# Patient Record
Sex: FEMALE | Race: OTHER | HISPANIC OR LATINO | Employment: UNEMPLOYED | URBAN - METROPOLITAN AREA
[De-identification: names, ages, dates, MRNs, and addresses within clinical notes are randomized per-mention and may not be internally consistent; named-entity substitution may affect disease eponyms.]

---

## 2024-02-28 ENCOUNTER — TELEPHONE (OUTPATIENT)
Dept: OBSTETRICS AND GYNECOLOGY | Facility: CLINIC | Age: 24
End: 2024-02-28

## 2024-02-28 NOTE — TELEPHONE ENCOUNTER
----- Message from Nidia Madden sent at 2/28/2024 11:16 AM CST -----  Contact: Yen Small  MRN: [id number]  Home Phone      [phone]  Work Phone      Not on file.  Mobile          Not on file.    No care team member to display  OB? Yes, Unknown  What phone number can you be reached at? [phone]  Message: positive pregnancy through [facility]

## 2024-03-04 ENCOUNTER — OFFICE VISIT (OUTPATIENT)
Dept: OBSTETRICS AND GYNECOLOGY | Facility: CLINIC | Age: 24
End: 2024-03-04
Payer: MEDICAID

## 2024-03-04 ENCOUNTER — LAB VISIT (OUTPATIENT)
Dept: LAB | Facility: HOSPITAL | Age: 24
End: 2024-03-04
Attending: OBSTETRICS & GYNECOLOGY
Payer: MEDICAID

## 2024-03-04 VITALS
BODY MASS INDEX: 24.88 KG/M2 | HEIGHT: 62 IN | DIASTOLIC BLOOD PRESSURE: 62 MMHG | WEIGHT: 135.19 LBS | SYSTOLIC BLOOD PRESSURE: 100 MMHG | HEART RATE: 102 BPM

## 2024-03-04 DIAGNOSIS — R39.15 URINARY URGENCY: ICD-10-CM

## 2024-03-04 DIAGNOSIS — Z32.01 PREGNANCY EXAMINATION OR TEST, POSITIVE RESULT: Primary | ICD-10-CM

## 2024-03-04 DIAGNOSIS — Z11.3 SCREENING EXAMINATION FOR VENEREAL DISEASE: ICD-10-CM

## 2024-03-04 DIAGNOSIS — N63.11 MASS OF UPPER OUTER QUADRANT OF RIGHT BREAST: ICD-10-CM

## 2024-03-04 DIAGNOSIS — Z12.4 CERVICAL CANCER SCREENING: ICD-10-CM

## 2024-03-04 DIAGNOSIS — Z32.01 PREGNANCY EXAMINATION OR TEST, POSITIVE RESULT: ICD-10-CM

## 2024-03-04 LAB
ABO + RH BLD: NORMAL
B-HCG UR QL: POSITIVE
BASOPHILS # BLD AUTO: 0.04 K/UL (ref 0–0.2)
BASOPHILS NFR BLD: 0.6 % (ref 0–1.9)
BLD GP AB SCN CELLS X3 SERPL QL: NORMAL
CTP QC/QA: YES
DIFFERENTIAL METHOD BLD: ABNORMAL
EOSINOPHIL # BLD AUTO: 0 K/UL (ref 0–0.5)
EOSINOPHIL NFR BLD: 0.1 % (ref 0–8)
ERYTHROCYTE [DISTWIDTH] IN BLOOD BY AUTOMATED COUNT: 13.3 % (ref 11.5–14.5)
HBV SURFACE AG SERPL QL IA: NORMAL
HCG INTACT+B SERPL-ACNC: 1500 MIU/ML
HCT VFR BLD AUTO: 37.9 % (ref 37–48.5)
HCV AB SERPL QL IA: NORMAL
HGB BLD-MCNC: 12.7 G/DL (ref 12–16)
HIV 1+2 AB+HIV1 P24 AG SERPL QL IA: NORMAL
IMM GRANULOCYTES # BLD AUTO: 0.01 K/UL (ref 0–0.04)
IMM GRANULOCYTES NFR BLD AUTO: 0.1 % (ref 0–0.5)
LYMPHOCYTES # BLD AUTO: 1.8 K/UL (ref 1–4.8)
LYMPHOCYTES NFR BLD: 26.5 % (ref 18–48)
MCH RBC QN AUTO: 29.7 PG (ref 27–31)
MCHC RBC AUTO-ENTMCNC: 33.5 G/DL (ref 32–36)
MCV RBC AUTO: 89 FL (ref 82–98)
MONOCYTES # BLD AUTO: 0.5 K/UL (ref 0.3–1)
MONOCYTES NFR BLD: 7.6 % (ref 4–15)
NEUTROPHILS # BLD AUTO: 4.4 K/UL (ref 1.8–7.7)
NEUTROPHILS NFR BLD: 65.1 % (ref 38–73)
NRBC BLD-RTO: 0 /100 WBC
PLATELET # BLD AUTO: 265 K/UL (ref 150–450)
PMV BLD AUTO: 9.1 FL (ref 9.2–12.9)
RBC # BLD AUTO: 4.27 M/UL (ref 4–5.4)
WBC # BLD AUTO: 6.8 K/UL (ref 3.9–12.7)

## 2024-03-04 PROCEDURE — 99999 PR PBB SHADOW E&M-EST. PATIENT-LVL III: CPT | Mod: PBBFAC,,, | Performed by: OBSTETRICS & GYNECOLOGY

## 2024-03-04 PROCEDURE — 99204 OFFICE O/P NEW MOD 45 MIN: CPT | Mod: S$PBB,TH,, | Performed by: OBSTETRICS & GYNECOLOGY

## 2024-03-04 PROCEDURE — 84702 CHORIONIC GONADOTROPIN TEST: CPT | Performed by: OBSTETRICS & GYNECOLOGY

## 2024-03-04 PROCEDURE — 86592 SYPHILIS TEST NON-TREP QUAL: CPT | Performed by: OBSTETRICS & GYNECOLOGY

## 2024-03-04 PROCEDURE — 87340 HEPATITIS B SURFACE AG IA: CPT | Performed by: OBSTETRICS & GYNECOLOGY

## 2024-03-04 PROCEDURE — 86901 BLOOD TYPING SEROLOGIC RH(D): CPT | Performed by: OBSTETRICS & GYNECOLOGY

## 2024-03-04 PROCEDURE — 86803 HEPATITIS C AB TEST: CPT | Performed by: OBSTETRICS & GYNECOLOGY

## 2024-03-04 PROCEDURE — 81220 CFTR GENE COM VARIANTS: CPT | Performed by: OBSTETRICS & GYNECOLOGY

## 2024-03-04 PROCEDURE — 87624 HPV HI-RISK TYP POOLED RSLT: CPT | Performed by: OBSTETRICS & GYNECOLOGY

## 2024-03-04 PROCEDURE — 86762 RUBELLA ANTIBODY: CPT | Performed by: OBSTETRICS & GYNECOLOGY

## 2024-03-04 PROCEDURE — 85025 COMPLETE CBC W/AUTO DIFF WBC: CPT | Performed by: OBSTETRICS & GYNECOLOGY

## 2024-03-04 PROCEDURE — 81025 URINE PREGNANCY TEST: CPT | Mod: PBBFAC | Performed by: OBSTETRICS & GYNECOLOGY

## 2024-03-04 PROCEDURE — 36415 COLL VENOUS BLD VENIPUNCTURE: CPT | Performed by: OBSTETRICS & GYNECOLOGY

## 2024-03-04 PROCEDURE — 87491 CHLMYD TRACH DNA AMP PROBE: CPT | Performed by: OBSTETRICS & GYNECOLOGY

## 2024-03-04 PROCEDURE — 88141 CYTOPATH C/V INTERPRET: CPT | Mod: ,,, | Performed by: PATHOLOGY

## 2024-03-04 PROCEDURE — 87086 URINE CULTURE/COLONY COUNT: CPT | Performed by: OBSTETRICS & GYNECOLOGY

## 2024-03-04 PROCEDURE — 87389 HIV-1 AG W/HIV-1&-2 AB AG IA: CPT | Performed by: OBSTETRICS & GYNECOLOGY

## 2024-03-04 PROCEDURE — 99999PBSHW POCT URINE PREGNANCY: Mod: PBBFAC,,,

## 2024-03-04 PROCEDURE — 99213 OFFICE O/P EST LOW 20 MIN: CPT | Mod: PBBFAC | Performed by: OBSTETRICS & GYNECOLOGY

## 2024-03-04 PROCEDURE — 88175 CYTOPATH C/V AUTO FLUID REDO: CPT | Performed by: PATHOLOGY

## 2024-03-04 NOTE — PROGRESS NOTES
Subjective:   Patient ID: Manuel Davies is a 23 y.o. y.o. female.     Chief Complaint: Missed Menses       History of Present Illness:    Manuel Washington presents today complaining of amenorrhea. Patient's last menstrual period was 2023.. Prior menstrual cycles have been regular. She reports breast tenderness, positive home pregnancy test, and no other symptoms. UPT is positive.       History reviewed. No pertinent past medical history.  History reviewed. No pertinent surgical history.  Social History     Socioeconomic History    Marital status: Significant Other   Tobacco Use    Smoking status: Never    Smokeless tobacco: Never   Substance and Sexual Activity    Alcohol use: Not Currently     Comment: socially    Drug use: Never    Sexual activity: Yes     Partners: Male     Birth control/protection: None     Comment:      Family History   Problem Relation Age of Onset    Breast cancer Neg Hx     Colon cancer Neg Hx     Ovarian cancer Neg Hx      OB History    Para Term  AB Living   0 0 0 0 0 0   SAB IAB Ectopic Multiple Live Births   0 0 0 0 0         ROS:   Review of Systems   Constitutional:  Negative for chills, diaphoresis, fatigue, fever and unexpected weight change.   HENT:  Negative for congestion, hearing loss, postnasal drip, rhinorrhea, sinus pressure, sinus pain, sore throat and tinnitus.    Eyes:  Negative for pain, discharge and visual disturbance.   Respiratory:  Negative for apnea, cough, shortness of breath and wheezing.    Cardiovascular:  Negative for chest pain, palpitations and leg swelling.   Gastrointestinal:  Negative for abdominal pain, constipation, diarrhea, nausea and vomiting.   Endocrine: Negative for cold intolerance, heat intolerance, polydipsia, polyphagia and polyuria.   Genitourinary:  Negative for difficulty urinating, dyspareunia, dysuria, enuresis, flank pain, frequency, genital sores, hematuria, menstrual problem, pelvic pain,  urgency, vaginal bleeding, vaginal discharge and vaginal pain.   Musculoskeletal:  Negative for arthralgias, back pain, joint swelling, myalgias, neck pain and neck stiffness.   Skin:  Negative for color change, pallor and rash.   Allergic/Immunologic: Negative for environmental allergies, food allergies and immunocompromised state.   Neurological:  Negative for dizziness, weakness, light-headedness, numbness and headaches.   Hematological:  Negative for adenopathy. Does not bruise/bleed easily.   Psychiatric/Behavioral:  Negative for agitation and confusion. The patient is not nervous/anxious.            Objective:   Vital Signs:  Vitals:    03/04/24 1051   BP: 100/62   Pulse: 102       Physical Exam:  General:  alert, cooperative, no distress   Head: Normocephalic, atraumatic   Neck: Supple, Normal ROM   Breast:  1cm cystic mass at 10 oclock.     Respiratory: Normal effort   Neuro/Psych: Alert and oriented, appropriate mood and affect   Abdomen:   Soft, NT   Pelvis: External genitalia: normal general appearance  Urinary system: urethral meatus normal, bladder nontender  Vaginal: normal mucosa without prolapse or lesions  Cervix: normal appearance  Uterus: normal size, shape, position  Adnexa: normal size, nontender bilaterally       Assessment:      1. Amenorrhea    2. Urinary urgency    3. Screening examination for venereal disease    4. Cervical cancer screening          Plan:        Amenorrhea  -     POCT urine pregnancy    Urinary urgency  -     Urine culture    Screening examination for venereal disease  -     C. trachomatis/N. gonorrhoeae by AMP DNA [facility]; Cervix    Cervical cancer screening  -     Liquid-Based Pap Smear, Screening        1. Pap today  2. Prenatal labs ordered and GC/CT performed.  3. Schedule Ultrasound  4. Follow up in 4 weeks after ultrasound.    Patient was counseled today on proper weight gain based on the [place] [facility]'s recommendations based on her pre-pregnancy weight.  Discussed foods to avoid in pregnancy (i.e. sushi, fish that are high in mercury, deli meat, and unpasteurized cheeses). Discussed prenatal vitamin options (i.e. stool softener, DHA). She was also counseled on safe, healthy behavior as well as medications safe in pregnancy.

## 2024-03-05 ENCOUNTER — TELEPHONE (OUTPATIENT)
Dept: OBSTETRICS AND GYNECOLOGY | Facility: CLINIC | Age: 24
End: 2024-03-05
Payer: MEDICAID

## 2024-03-05 LAB
BACTERIA UR CULT: NORMAL
BACTERIA UR CULT: NORMAL
C TRACH DNA SPEC QL NAA+PROBE: DETECTED
N GONORRHOEA DNA SPEC QL NAA+PROBE: NOT DETECTED
RPR SER QL: NORMAL
RUBV IGG SER-ACNC: <5 IU/ML
RUBV IGG SER-IMP: ABNORMAL

## 2024-03-05 NOTE — TELEPHONE ENCOUNTER
Sara ID# [id number]- date of birth and name verified.   Contacted pt to discuss lack of insurance for maternity care.  Pt states she applied 3/4/2024.  Instructed to bring a copy of eligibility letter or card once received, pt verbalized understanding.

## 2024-03-06 DIAGNOSIS — A74.9 CHLAMYDIA INFECTION AFFECTING PREGNANCY, ANTEPARTUM: Primary | ICD-10-CM

## 2024-03-06 DIAGNOSIS — O98.819 CHLAMYDIA INFECTION AFFECTING PREGNANCY, ANTEPARTUM: Primary | ICD-10-CM

## 2024-03-06 RX ORDER — AZITHROMYCIN 500 MG/1
1000 TABLET, FILM COATED ORAL ONCE
Qty: 2 TABLET | Refills: 0 | Status: SHIPPED | OUTPATIENT
Start: 2024-03-06 | End: 2024-03-06

## 2024-03-08 LAB — CFTR MUT ANL BLD/T: NORMAL

## 2024-03-11 ENCOUNTER — TELEPHONE (OUTPATIENT)
Dept: OBSTETRICS AND GYNECOLOGY | Facility: CLINIC | Age: 24
End: 2024-03-11
Payer: MEDICAID

## 2024-03-11 LAB
FINAL PATHOLOGIC DIAGNOSIS: ABNORMAL
Lab: ABNORMAL

## 2024-03-11 NOTE — TELEPHONE ENCOUNTER
----- Message from Nidia Madden sent at 3/11/2024 11:46 AM CDT -----  Contact: Self  Manuel Davies  MRN: [id number]  Home Phone      [phone]  Work Phone      Not on file.  Mobile          [phone]    Patient Care Team:  No, Primary Doctor as PCP - Linn West MD as Consulting Physician (Obstetrics and Gynecology)  OB? Yes, Unknown  What phone number can you be reached at? [phone]  Message: states she needs medication refilled for partner  Pharmacy: [facility] in [place]

## 2024-03-11 NOTE — TELEPHONE ENCOUNTER
Patient was called using language line  Annetta ID# [id number]. Pt called to initiate partner therapy. Partner with patient and received information from partner (MRN [id number]).

## 2024-03-15 LAB
HPV HR 12 DNA SPEC QL NAA+PROBE: POSITIVE
HPV16 AG SPEC QL: NEGATIVE
HPV18 DNA SPEC QL NAA+PROBE: NEGATIVE

## 2024-03-23 ENCOUNTER — HOSPITAL ENCOUNTER (EMERGENCY)
Facility: HOSPITAL | Age: 24
Discharge: HOME | End: 2024-03-23
Attending: SURGERY

## 2024-03-23 VITALS
RESPIRATION RATE: 18 BRPM | HEART RATE: 71 BPM | HEIGHT: 68 IN | DIASTOLIC BLOOD PRESSURE: 64 MMHG | OXYGEN SATURATION: 100 % | BODY MASS INDEX: 20.92 KG/M2 | WEIGHT: 138 LBS | TEMPERATURE: 99 F | SYSTOLIC BLOOD PRESSURE: 100 MMHG

## 2024-03-23 DIAGNOSIS — O03.9 MISCARRIAGE: Primary | ICD-10-CM

## 2024-03-23 LAB
ALBUMIN SERPL BCP-MCNC: 4 G/DL (ref 3.5–5.2)
ALP SERPL-CCNC: 65 U/L (ref 55–135)
ALT SERPL W/O P-5'-P-CCNC: 16 U/L (ref 10–44)
AMORPH CRY URNS QL MICRO: ABNORMAL
ANION GAP SERPL CALC-SCNC: 9 MMOL/L (ref 8–16)
AST SERPL-CCNC: 20 U/L (ref 10–40)
BACTERIA #/AREA URNS HPF: ABNORMAL /HPF
BASOPHILS # BLD AUTO: 0.05 K/UL (ref 0–0.2)
BASOPHILS NFR BLD: 0.5 % (ref 0–1.9)
BILIRUB SERPL-MCNC: 0.3 MG/DL (ref 0.1–1)
BILIRUB UR QL STRIP: NEGATIVE
BUN SERPL-MCNC: 11 MG/DL (ref 6–20)
CALCIUM SERPL-MCNC: 9.4 MG/DL (ref 8.7–10.5)
CHLORIDE SERPL-SCNC: 107 MMOL/L (ref 95–110)
CLARITY UR: CLEAR
CO2 SERPL-SCNC: 21 MMOL/L (ref 23–29)
COLOR UR: YELLOW
CREAT SERPL-MCNC: 0.6 MG/DL (ref 0.5–1.4)
DIFFERENTIAL METHOD BLD: NORMAL
EOSINOPHIL # BLD AUTO: 0.1 K/UL (ref 0–0.5)
EOSINOPHIL NFR BLD: 0.7 % (ref 0–8)
ERYTHROCYTE [DISTWIDTH] IN BLOOD BY AUTOMATED COUNT: 13.1 % (ref 11.5–14.5)
EST. GFR  (NO RACE VARIABLE): >60 ML/MIN/1.73 M^2
GLUCOSE SERPL-MCNC: 89 MG/DL (ref 70–110)
GLUCOSE UR QL STRIP: NEGATIVE
HCG INTACT+B SERPL-ACNC: NORMAL MIU/ML
HCT VFR BLD AUTO: 37.2 % (ref 37–48.5)
HGB BLD-MCNC: 12.7 G/DL (ref 12–16)
HGB UR QL STRIP: ABNORMAL
IMM GRANULOCYTES # BLD AUTO: 0.03 K/UL (ref 0–0.04)
IMM GRANULOCYTES NFR BLD AUTO: 0.3 % (ref 0–0.5)
KETONES UR QL STRIP: NEGATIVE
LEUKOCYTE ESTERASE UR QL STRIP: NEGATIVE
LYMPHOCYTES # BLD AUTO: 2.2 K/UL (ref 1–4.8)
LYMPHOCYTES NFR BLD: 22.4 % (ref 18–48)
MCH RBC QN AUTO: 30.1 PG (ref 27–31)
MCHC RBC AUTO-ENTMCNC: 34.1 G/DL (ref 32–36)
MCV RBC AUTO: 88 FL (ref 82–98)
MICROSCOPIC COMMENT: ABNORMAL
MONOCYTES # BLD AUTO: 0.9 K/UL (ref 0.3–1)
MONOCYTES NFR BLD: 8.7 % (ref 4–15)
NEUTROPHILS # BLD AUTO: 6.5 K/UL (ref 1.8–7.7)
NEUTROPHILS NFR BLD: 67.4 % (ref 38–73)
NITRITE UR QL STRIP: NEGATIVE
NRBC BLD-RTO: 0 /100 WBC
PH UR STRIP: 6 [PH] (ref 5–8)
PLATELET # BLD AUTO: 283 K/UL (ref 150–450)
PMV BLD AUTO: 9.4 FL (ref 9.2–12.9)
POTASSIUM SERPL-SCNC: 3.9 MMOL/L (ref 3.5–5.1)
PROT SERPL-MCNC: 7.2 G/DL (ref 6–8.4)
PROT UR QL STRIP: ABNORMAL
RBC # BLD AUTO: 4.22 M/UL (ref 4–5.4)
RBC #/AREA URNS HPF: 15 /HPF (ref 0–4)
SODIUM SERPL-SCNC: 137 MMOL/L (ref 136–145)
SP GR UR STRIP: 1.02 (ref 1–1.03)
SQUAMOUS #/AREA URNS HPF: 2 /HPF
URN SPEC COLLECT METH UR: ABNORMAL
UROBILINOGEN UR STRIP-ACNC: NEGATIVE EU/DL
WBC # BLD AUTO: 9.72 K/UL (ref 3.9–12.7)

## 2024-03-23 PROCEDURE — 88305 TISSUE EXAM BY PATHOLOGIST: CPT | Performed by: PATHOLOGY

## 2024-03-23 PROCEDURE — 81000 URINALYSIS NONAUTO W/SCOPE: CPT | Performed by: SURGERY

## 2024-03-23 PROCEDURE — 88305 TISSUE EXAM BY PATHOLOGIST: CPT | Mod: 26,,, | Performed by: PATHOLOGY

## 2024-03-23 PROCEDURE — 84702 CHORIONIC GONADOTROPIN TEST: CPT | Performed by: SURGERY

## 2024-03-23 PROCEDURE — 80053 COMPREHEN METABOLIC PANEL: CPT | Performed by: SURGERY

## 2024-03-23 PROCEDURE — 85025 COMPLETE CBC W/AUTO DIFF WBC: CPT | Performed by: SURGERY

## 2024-03-23 PROCEDURE — 99284 EMERGENCY DEPT VISIT MOD MDM: CPT | Mod: 25

## 2024-03-23 RX ORDER — HYDROCODONE BITARTRATE AND ACETAMINOPHEN 5; 325 MG/1; MG/1
1 TABLET ORAL EVERY 4 HOURS PRN
Qty: 15 TABLET | Refills: 0 | Status: SHIPPED | OUTPATIENT
Start: 2024-03-23 | End: 2024-03-26

## 2024-03-23 RX ORDER — MORPHINE SULFATE 2 MG/ML
4 INJECTION, SOLUTION INTRAMUSCULAR; INTRAVENOUS
Status: DISCONTINUED | OUTPATIENT
Start: 2024-03-23 | End: 2024-03-23

## 2024-03-23 RX ORDER — ONDANSETRON HYDROCHLORIDE 2 MG/ML
4 INJECTION, SOLUTION INTRAVENOUS
Status: DISCONTINUED | OUTPATIENT
Start: 2024-03-23 | End: 2024-03-23

## 2024-03-23 NOTE — ED NOTES
"Pt called from the bathroom stating that she passed a clot. Upon inspection a tissue approximately 3"x1" was collected. Dr. Felton notified.   "

## 2024-03-23 NOTE — ED PROVIDER NOTES
Encounter Date: 3/23/2024       History     Chief Complaint   Patient presents with    Vaginal Bleeding     Pt to ED with c/o vaginal bleed and lower abdominal cramping onset this morning. Pt is approximately 14 weeks pregnant and sees Dr. Chaidez. Pt speaks Moh[place] and needs an .     History of Present Illness  Manuel Davies is a 23 y.o. female that presents with bleeding  Patient with vaginal bleeding today, passage of tissue in the ED, first-trimester  Patient thinks that she had a miscarriage, no active bleeding at this time in ED  Rh (+) blood type, does not need RhoGAM, denies abdominal pain in triage  Starting to have mild cramping, nonacute abdomen on ER assessment today    The history is provided by the patient. The history is limited by a language barrier.     Review of patient's allergies indicates:  No Known Allergies    History reviewed. No pertinent past medical history.  History reviewed. No pertinent surgical history.  Family History   Problem Relation Age of Onset    Breast cancer Neg Hx     Colon cancer Neg Hx     Ovarian cancer Neg Hx      Social History     Tobacco Use    Smoking status: Never    Smokeless tobacco: Never   Substance Use Topics    Alcohol use: Not Currently     Comment: socially    Drug use: Never       Review of Systems   Constitutional: Negative.    HENT: Negative.     Eyes: Negative.    Respiratory: Negative.     Cardiovascular: Negative.    Gastrointestinal:  Positive for abdominal pain.   Genitourinary:  Positive for vaginal bleeding.   Musculoskeletal: Negative.    Skin: Negative.    Neurological: Negative.    Psychiatric/Behavioral: Negative.         Physical Exam     Initial Vitals [03/23/24 0424]   BP Pulse Resp Temp SpO2   124/78 70 18 98.6 °F (37 °C) 100 %      MAP       --         Physical Exam    Nursing note and vitals reviewed.  Constitutional: Vital signs are normal. She appears well-developed and well-nourished. She is cooperative.    HENT:   Head: Normocephalic and atraumatic.   Eyes: Conjunctivae, EOM and lids are normal. Pupils are equal, round, and reactive to light.   Neck: Trachea normal and phonation normal. Neck supple. No JVD present.   Normal range of motion.   Full passive range of motion without pain.     Cardiovascular:  Normal rate, regular rhythm, S1 normal, S2 normal, normal heart sounds, intact distal pulses and normal pulses.           Pulmonary/Chest: Effort normal and breath sounds normal.   Abdominal: Abdomen is soft and flat. Bowel sounds are normal.   Musculoskeletal:         General: Normal range of motion.      Cervical back: Full passive range of motion without pain, normal range of motion and neck supple.     Neurological: She is alert and oriented to person, place, and time. She has normal strength.   Skin: Skin is warm, dry and intact. Capillary refill takes less than 2 seconds.         ED Course   Procedures  Labs Reviewed   COMPREHENSIVE METABOLIC PANEL - Abnormal; Notable for the following components:       Result Value    CO2 21 (*)     All other components within normal limits   URINALYSIS, REFLEX TO URINE CULTURE - Abnormal; Notable for the following components:    Protein, UA Trace (*)     Occult Blood UA 3+ (*)     All other components within normal limits    Narrative:     Specimen Source->Urine   URINALYSIS MICROSCOPIC - Abnormal; Notable for the following components:    RBC, UA 15 (*)     All other components within normal limits    Narrative:     Specimen Source->Urine   CBC W/ AUTO DIFFERENTIAL   HCG, QUANTITATIVE   SPECIMEN TO PATHOLOGY, SURGERY          Imaging Results              US OB <14 Wks, TransAbd, Single Gestation (Final result)  Result time 24 08:17:25      Final result by Mak Martinez Jr., MD (24 08:17:25)                   Impression:      Negative pelvic ultrasound.  No intrauterine gestational sac is seen.  This may represent a spontaneous  or ectopic  pregnancy.  Recommend follow-up by ultrasound and serial pregnancy testing.      Electronically signed by: Mak Martinez MD  Date:    03/23/2024  Time:    08:17               Narrative:    EXAMINATION:  US OB <14 WEEKS TRANSABDOM, SINGLE GESTATION    CLINICAL HISTORY:  bleeding;    TECHNIQUE:  Sagittal and transverse images are obtained through the filled bladder.    COMPARISON:  None    FINDINGS:  The uterus measures 9.2 x 5.5 cm.  No intrauterine gestational sac is identified.  The endometrial lining is within normal limits measuring 1.2 cm, both layers.  No fluid is seen in the lumen.    The right ovary measures 3 x 2.3 cm without mass or cyst.  The left ovary measures 2.7 x 2.3 cm without mass or cyst.  No free fluid is seen and no ectopic gestational sac is demonstrated.                                       Medications - No data to display  Medical Decision Making  23-year-old female first-trimester pregnancy with bleeding & passage of tissue  Differential: subchorionic hemorrhage, miscarriage, bleeding with pregnancy    Problems Addressed:  Miscarriage: complicated acute illness or injury    Amount and/or Complexity of Data Reviewed  Labs: ordered. Decision-making details documented in ED Course.  Radiology: ordered and independent interpretation performed.    ED Management & Risks of Complication, Morbidity, & Mortality:  Lab work within normal limits, does not require RhoGAM in the ER today  Beta-hCG 11,000, ultrasound shows no gestational sac or fetus now  This is a likely spontaneous miscarriage based on previous ultrasound  Counseled at length; will follow-up with OBGYN 1st thing Monday  Norco for pain on discharge, bleeding precautions given on discharge    Need for Hospitalization or Surgery with Social Determinants of Health:  This patient does not need to be hospitalized on ER evaluation today  The patient's diagnosis is not limited by social determinants of health  Does not require surgery or  procedure (major or minor), no risk factors    Clinical Impression:  Final diagnoses:  [O03.9] Miscarriage (Primary)          ED Disposition Condition    Discharge Stable          ED Prescriptions       Medication Sig Dispense Start Date End Date Auth. Provider    HYDROcodone-acetaminophen (NORCO) 5-325 mg per tablet Take 1 tablet by mouth every 4 (four) hours as needed for Pain. 15 tablet 3/23/2024 3/26/2024 Cristino Felton MD          Follow-up Information       Follow up With Specialties Details Why Contact Info    Linn Chaidez MD Obstetrics and Gynecology Go in 2 days  [address] [place] [address]  [phone]               Cristino Felton MD  03/23/24 9362

## 2024-03-25 ENCOUNTER — LAB VISIT (OUTPATIENT)
Dept: LAB | Facility: HOSPITAL | Age: 24
End: 2024-03-25
Attending: OBSTETRICS & GYNECOLOGY

## 2024-03-25 ENCOUNTER — OFFICE VISIT (OUTPATIENT)
Dept: OBSTETRICS AND GYNECOLOGY | Facility: CLINIC | Age: 24
End: 2024-03-25

## 2024-03-25 ENCOUNTER — TELEPHONE (OUTPATIENT)
Dept: OBSTETRICS AND GYNECOLOGY | Facility: CLINIC | Age: 24
End: 2024-03-25

## 2024-03-25 VITALS
WEIGHT: 134.63 LBS | DIASTOLIC BLOOD PRESSURE: 66 MMHG | HEIGHT: 68 IN | BODY MASS INDEX: 20.4 KG/M2 | HEART RATE: 71 BPM | SYSTOLIC BLOOD PRESSURE: 112 MMHG

## 2024-03-25 DIAGNOSIS — O03.9 MISCARRIAGE: ICD-10-CM

## 2024-03-25 DIAGNOSIS — O20.9 VAGINAL BLEEDING AFFECTING EARLY PREGNANCY: ICD-10-CM

## 2024-03-25 DIAGNOSIS — O20.9 VAGINAL BLEEDING AFFECTING EARLY PREGNANCY: Primary | ICD-10-CM

## 2024-03-25 LAB — HCG INTACT+B SERPL-ACNC: 1334 MIU/ML

## 2024-03-25 PROCEDURE — 99213 OFFICE O/P EST LOW 20 MIN: CPT | Mod: PBBFAC | Performed by: OBSTETRICS & GYNECOLOGY

## 2024-03-25 PROCEDURE — 36415 COLL VENOUS BLD VENIPUNCTURE: CPT | Performed by: OBSTETRICS & GYNECOLOGY

## 2024-03-25 PROCEDURE — 99213 OFFICE O/P EST LOW 20 MIN: CPT | Mod: S$PBB,,, | Performed by: OBSTETRICS & GYNECOLOGY

## 2024-03-25 PROCEDURE — 84702 CHORIONIC GONADOTROPIN TEST: CPT | Performed by: OBSTETRICS & GYNECOLOGY

## 2024-03-25 PROCEDURE — 99999 PR PBB SHADOW E&M-EST. PATIENT-LVL III: CPT | Mod: PBBFAC,,, | Performed by: OBSTETRICS & GYNECOLOGY

## 2024-03-25 NOTE — TELEPHONE ENCOUNTER
Yen was called and appt was scheduled for pt to f/u from ER visit for SAB. She voiced understanding.

## 2024-03-25 NOTE — PROGRESS NOTES
Subjective:       Patient ID: Manuel Davies is a 23 y.o. female.    Chief Complaint:  Follow-up (E/r f/u sab)      History of Present Illness  Follow-up  Pertinent negatives include no abdominal pain, arthralgias, chest pain, chills, congestion, coughing, diaphoresis, fatigue, fever, headaches, joint swelling, myalgias, nausea, neck pain, numbness, rash, sore throat, vomiting or weakness.     Pt here today for ER follow up.  She started with heavy bleeding overnight and was evaluated in the ER.    Ultrasound at that time was negative (full report below)  Hcg 45777    FINDINGS:  The uterus measures 9.2 x 5.5 cm.  No intrauterine gestational sac is identified.  The endometrial lining is within normal limits measuring 1.2 cm, both layers.  No fluid is seen in the lumen.     The right ovary measures 3 x 2.3 cm without mass or cyst.  The left ovary measures 2.7 x 2.3 cm without mass or cyst.  No free fluid is seen and no ectopic gestational sac is demonstrated.     Impression:     Negative pelvic ultrasound.  No intrauterine gestational sac is seen.  This may represent a spontaneous  or ectopic pregnancy.  Recommend follow-up by ultrasound and serial pregnancy testing.      GYN & OB History  Patient's last menstrual period was 2023.   Date of Last Pap: 3/11/2024    OB History    Para Term  AB Living   1 0 0 0 0 0   SAB IAB Ectopic Multiple Live Births   0 0 0 0 0      # Outcome Date GA Lbr Js/2nd Weight Sex Delivery Anes PTL Lv   1 Current                Review of Systems  Review of Systems   Constitutional:  Negative for chills, diaphoresis, fatigue, fever and unexpected weight change.   HENT:  Negative for congestion, hearing loss, postnasal drip, rhinorrhea, sinus pressure, sinus pain, sore throat and tinnitus.    Eyes:  Negative for pain, discharge and visual disturbance.   Respiratory:  Negative for apnea, cough, shortness of breath and wheezing.    Cardiovascular:   Negative for chest pain, palpitations and leg swelling.   Gastrointestinal:  Negative for abdominal pain, constipation, diarrhea, nausea and vomiting.   Endocrine: Negative for cold intolerance, heat intolerance, polydipsia, polyphagia and polyuria.   Genitourinary:  Positive for vaginal bleeding. Negative for difficulty urinating, dyspareunia, dysuria, enuresis, flank pain, frequency, genital sores, hematuria, menstrual problem, pelvic pain, urgency, vaginal discharge and vaginal pain.   Musculoskeletal:  Negative for arthralgias, back pain, joint swelling, myalgias, neck pain and neck stiffness.   Skin:  Negative for color change, pallor and rash.   Allergic/Immunologic: Negative for environmental allergies, food allergies and immunocompromised state.   Neurological:  Negative for dizziness, weakness, light-headedness, numbness and headaches.   Hematological:  Negative for adenopathy. Does not bruise/bleed easily.   Psychiatric/Behavioral:  Negative for agitation and confusion. The patient is not nervous/anxious.            Objective:    Physical Exam:   Constitutional: She is oriented to person, place, and time. She appears well-developed and well-nourished. No distress.    HENT:   Head: Normocephalic and atraumatic.    Eyes: Conjunctivae and EOM are normal.      Pulmonary/Chest: Effort normal. No respiratory distress.          Genitourinary:    Uterus, right adnexa and left adnexa normal.   The external female genitalia was normal.   Cervix is normal. Right adnexum displays no tenderness and no fullness. Left adnexum displays no tenderness and no fullness. There is bleeding in the vagina. Cervix exhibits no motion tenderness, no lesion and no friability. Uterus is not enlarged and not tender.           Musculoskeletal: Normal range of motion.       Neurological: She is alert and oriented to person, place, and time.    Skin: Skin is warm and dry.    Psychiatric: She has a normal mood and affect. Her behavior is  normal. Judgment and thought content normal.          Assessment:        1. Vaginal bleeding affecting early pregnancy    2. Miscarriage               Plan:      Manuel Washington was seen today for follow-up.    Diagnoses and all orders for this visit:    Vaginal bleeding affecting early pregnancy  -     HCG, Quantitative; Future    Miscarriage  -     HCG, Quantitative; Future      History, examination and ultrasound findings most consistent with SAB.  Will confirm declining hcg today.

## 2024-03-25 NOTE — TELEPHONE ENCOUNTER
----- Message from Mavis Cruz MA sent at 3/25/2024  8:22 AM CDT -----  Contact: Yen / friend  Manuel Davies  MRN: [id number]  Home Phone      Not on file.  Work Phone      Not on file.  Mobile          [phone]    Patient Care Team:  No, Primary Doctor as PCP - Linn West MD as Consulting Physician (Obstetrics and Gynecology)  OB? No  What phone number can you be reached at? [phone]  Message: Needs to make appt for ER f/u miscarriage.

## 2024-04-03 LAB
FINAL PATHOLOGIC DIAGNOSIS: NORMAL
GROSS: NORMAL
Lab: NORMAL

## 2024-04-16 ENCOUNTER — TELEPHONE (OUTPATIENT)
Dept: OBSTETRICS AND GYNECOLOGY | Facility: CLINIC | Age: 24
End: 2024-04-16

## 2024-04-16 DIAGNOSIS — O03.9 SAB (SPONTANEOUS ABORTION): Primary | ICD-10-CM

## 2024-04-16 NOTE — TELEPHONE ENCOUNTER
Attempted to contact pt using language line  Fernando ID# [id number]. He attempted patient's main number twice. No answer and voicemail not set up. He attempted patients friend Yen's number in chart and her received message that number is not available.

## 2024-04-16 NOTE — TELEPHONE ENCOUNTER
Called pt using language line  Kathy ID# [id number]. Pt complained of starting with cramping today. Pt stated it feels like it was when she was having SAB last month and does not feel like menstrual-like cramping. Vaginal bleeding from SAB resolved on 4/1/24 per pt and she has not had a cycle since. Pt took hydrocodone rx today given by the ER at the time of her visit on 3/23/24, but c/o nausea with this. Please advise. Thank you.

## 2024-04-16 NOTE — TELEPHONE ENCOUNTER
----- Message from Mavis Cruz MA sent at 4/16/2024 11:41 AM CDT -----  Contact: self /   Manuel Davies  MRN: [id number]  Home Phone      [phone]  Work Phone      Not on file.  Mobile          [phone]    Patient Care Team:  No, Primary Doctor as PCP - General  Linn Chaidez MD as Consulting Physician (Obstetrics and Gynecology)  OB? No  What phone number can you be reached at? [phone]  Message:  Needs to speak to nurse regarding had miscarriage two weeks ago, and is still having cramping.

## 2024-04-17 ENCOUNTER — HOSPITAL ENCOUNTER (OUTPATIENT)
Dept: RADIOLOGY | Facility: HOSPITAL | Age: 24
Discharge: HOME | End: 2024-04-17
Attending: OBSTETRICS & GYNECOLOGY

## 2024-04-17 DIAGNOSIS — N63.11 MASS OF UPPER OUTER QUADRANT OF RIGHT BREAST: ICD-10-CM

## 2024-04-17 PROCEDURE — 76642 ULTRASOUND BREAST LIMITED: CPT | Mod: 26,RT,, | Performed by: RADIOLOGY

## 2024-04-17 PROCEDURE — 76642 ULTRASOUND BREAST LIMITED: CPT | Mod: TC,RT

## 2024-08-23 ENCOUNTER — TELEPHONE (OUTPATIENT)
Dept: OBSTETRICS AND GYNECOLOGY | Facility: CLINIC | Age: 24
End: 2024-08-23

## 2024-08-23 NOTE — TELEPHONE ENCOUNTER
----- Message from Mavis Cruz MA sent at 8/23/2024  9:30 AM CDT -----  Contact: Yne / friend  Manuel Davies  MRN: [id number]  Home Phone      [phone]  Work Phone      Not on file.  Mobile          [phone]    Patient Care Team:  No, Primary Doctor as PCP - Linn West MD as Consulting Physician (Obstetrics and Gynecology)  OB? No  What phone number can you be reached at? [phone]  Message: Needs to make appt for dx preg.  Patient will be a NP (self pay) and requesting Dr Chaidez.   lab results

## 2024-09-05 ENCOUNTER — TELEPHONE (OUTPATIENT)
Dept: OBSTETRICS AND GYNECOLOGY | Facility: CLINIC | Age: 24
End: 2024-09-05

## 2024-09-05 NOTE — TELEPHONE ENCOUNTER
----- Message from Mavis Cruz MA sent at 9/5/2024  9:02 AM CDT -----  Contact: self  Manuel Davies  MRN: [id number]  Home Phone      [phone]  Work Phone      Not on file.  Mobile          [phone]    Patient Care Team:  No, Primary Doctor as PCP - Linn West MD as Consulting Physician (Obstetrics and Gynecology)  OB? No  What phone number can you be reached at? [phone]  Message: Needs to make appt for dx preg, and requesting Dr Chaidez.

## 2024-09-16 ENCOUNTER — LAB VISIT (OUTPATIENT)
Dept: LAB | Facility: HOSPITAL | Age: 24
End: 2024-09-16
Attending: OBSTETRICS & GYNECOLOGY
Payer: MEDICAID

## 2024-09-16 ENCOUNTER — OFFICE VISIT (OUTPATIENT)
Dept: OBSTETRICS AND GYNECOLOGY | Facility: CLINIC | Age: 24
End: 2024-09-16
Payer: MEDICAID

## 2024-09-16 VITALS
BODY MASS INDEX: 21.22 KG/M2 | WEIGHT: 140 LBS | SYSTOLIC BLOOD PRESSURE: 104 MMHG | HEART RATE: 67 BPM | HEIGHT: 68 IN | DIASTOLIC BLOOD PRESSURE: 78 MMHG

## 2024-09-16 DIAGNOSIS — Z11.3 SCREENING EXAMINATION FOR VENEREAL DISEASE: ICD-10-CM

## 2024-09-16 DIAGNOSIS — Z32.01 PREGNANCY EXAMINATION OR TEST, POSITIVE RESULT: ICD-10-CM

## 2024-09-16 DIAGNOSIS — N91.2 AMENORRHEA: ICD-10-CM

## 2024-09-16 DIAGNOSIS — R39.15 URINARY URGENCY: ICD-10-CM

## 2024-09-16 DIAGNOSIS — Z32.01 PREGNANCY EXAMINATION OR TEST, POSITIVE RESULT: Primary | ICD-10-CM

## 2024-09-16 LAB
ABO + RH BLD: NORMAL
B-HCG UR QL: POSITIVE
BASOPHILS # BLD AUTO: 0.04 K/UL (ref 0–0.2)
BASOPHILS NFR BLD: 0.5 % (ref 0–1.9)
BLD GP AB SCN CELLS X3 SERPL QL: NORMAL
C TRACH DNA SPEC QL NAA+PROBE: NOT DETECTED
CTP QC/QA: YES
DIFFERENTIAL METHOD BLD: NORMAL
EOSINOPHIL # BLD AUTO: 0 K/UL (ref 0–0.5)
EOSINOPHIL NFR BLD: 0.5 % (ref 0–8)
ERYTHROCYTE [DISTWIDTH] IN BLOOD BY AUTOMATED COUNT: 13.3 % (ref 11.5–14.5)
HBV SURFACE AG SERPL QL IA: NORMAL
HCT VFR BLD AUTO: 37.9 % (ref 37–48.5)
HCV AB SERPL QL IA: NORMAL
HGB BLD-MCNC: 12.8 G/DL (ref 12–16)
HIV 1+2 AB+HIV1 P24 AG SERPL QL IA: NORMAL
IMM GRANULOCYTES # BLD AUTO: 0.03 K/UL (ref 0–0.04)
IMM GRANULOCYTES NFR BLD AUTO: 0.4 % (ref 0–0.5)
LYMPHOCYTES # BLD AUTO: 1.9 K/UL (ref 1–4.8)
LYMPHOCYTES NFR BLD: 25.3 % (ref 18–48)
MCH RBC QN AUTO: 30 PG (ref 27–31)
MCHC RBC AUTO-ENTMCNC: 33.8 G/DL (ref 32–36)
MCV RBC AUTO: 89 FL (ref 82–98)
MONOCYTES # BLD AUTO: 0.6 K/UL (ref 0.3–1)
MONOCYTES NFR BLD: 8.4 % (ref 4–15)
N GONORRHOEA DNA SPEC QL NAA+PROBE: NOT DETECTED
NEUTROPHILS # BLD AUTO: 4.8 K/UL (ref 1.8–7.7)
NEUTROPHILS NFR BLD: 64.9 % (ref 38–73)
NRBC BLD-RTO: 0 /100 WBC
PLATELET # BLD AUTO: 271 K/UL (ref 150–450)
PMV BLD AUTO: 9.3 FL (ref 9.2–12.9)
RBC # BLD AUTO: 4.26 M/UL (ref 4–5.4)
TREPONEMA PALLIDUM IGG+IGM AB [PRESENCE] IN SERUM OR PLASMA BY IMMUNOASSAY: NONREACTIVE
WBC # BLD AUTO: 7.34 K/UL (ref 3.9–12.7)

## 2024-09-16 PROCEDURE — 81025 URINE PREGNANCY TEST: CPT | Mod: PBBFAC | Performed by: OBSTETRICS & GYNECOLOGY

## 2024-09-16 PROCEDURE — 87591 N.GONORRHOEAE DNA AMP PROB: CPT | Performed by: OBSTETRICS & GYNECOLOGY

## 2024-09-16 PROCEDURE — 85025 COMPLETE CBC W/AUTO DIFF WBC: CPT | Performed by: OBSTETRICS & GYNECOLOGY

## 2024-09-16 PROCEDURE — 3008F BODY MASS INDEX DOCD: CPT | Mod: CPTII,,, | Performed by: OBSTETRICS & GYNECOLOGY

## 2024-09-16 PROCEDURE — 86593 SYPHILIS TEST NON-TREP QUANT: CPT | Performed by: OBSTETRICS & GYNECOLOGY

## 2024-09-16 PROCEDURE — 99999PBSHW POCT URINE PREGNANCY: Mod: PBBFAC,,,

## 2024-09-16 PROCEDURE — 3078F DIAST BP <80 MM HG: CPT | Mod: CPTII,,, | Performed by: OBSTETRICS & GYNECOLOGY

## 2024-09-16 PROCEDURE — 3074F SYST BP LT 130 MM HG: CPT | Mod: CPTII,,, | Performed by: OBSTETRICS & GYNECOLOGY

## 2024-09-16 PROCEDURE — 86900 BLOOD TYPING SEROLOGIC ABO: CPT | Performed by: OBSTETRICS & GYNECOLOGY

## 2024-09-16 PROCEDURE — 1159F MED LIST DOCD IN RCRD: CPT | Mod: CPTII,,, | Performed by: OBSTETRICS & GYNECOLOGY

## 2024-09-16 PROCEDURE — 87086 URINE CULTURE/COLONY COUNT: CPT | Performed by: OBSTETRICS & GYNECOLOGY

## 2024-09-16 PROCEDURE — 87389 HIV-1 AG W/HIV-1&-2 AB AG IA: CPT | Performed by: OBSTETRICS & GYNECOLOGY

## 2024-09-16 PROCEDURE — 86762 RUBELLA ANTIBODY: CPT | Performed by: OBSTETRICS & GYNECOLOGY

## 2024-09-16 PROCEDURE — 86803 HEPATITIS C AB TEST: CPT | Performed by: OBSTETRICS & GYNECOLOGY

## 2024-09-16 PROCEDURE — 86850 RBC ANTIBODY SCREEN: CPT | Performed by: OBSTETRICS & GYNECOLOGY

## 2024-09-16 PROCEDURE — 1160F RVW MEDS BY RX/DR IN RCRD: CPT | Mod: CPTII,,, | Performed by: OBSTETRICS & GYNECOLOGY

## 2024-09-16 PROCEDURE — 87491 CHLMYD TRACH DNA AMP PROBE: CPT | Performed by: OBSTETRICS & GYNECOLOGY

## 2024-09-16 PROCEDURE — 99204 OFFICE O/P NEW MOD 45 MIN: CPT | Mod: S$PBB,TH,, | Performed by: OBSTETRICS & GYNECOLOGY

## 2024-09-16 PROCEDURE — 99999 PR PBB SHADOW E&M-EST. PATIENT-LVL III: CPT | Mod: PBBFAC,,, | Performed by: OBSTETRICS & GYNECOLOGY

## 2024-09-16 PROCEDURE — 36415 COLL VENOUS BLD VENIPUNCTURE: CPT | Performed by: OBSTETRICS & GYNECOLOGY

## 2024-09-16 PROCEDURE — 87340 HEPATITIS B SURFACE AG IA: CPT | Performed by: OBSTETRICS & GYNECOLOGY

## 2024-09-16 PROCEDURE — 99213 OFFICE O/P EST LOW 20 MIN: CPT | Mod: PBBFAC,TH | Performed by: OBSTETRICS & GYNECOLOGY

## 2024-09-16 NOTE — PROGRESS NOTES
Subjective:   Patient ID: Manuel Davies is a 24 y.o. y.o. female.     Chief Complaint: Missed Menses       History of Present Illness:    Manuel Washington presents today complaining of amenorrhea. Patient's last menstrual period was 2024 (exact date).. Prior menstrual cycles have been regular. She reports breast tenderness, fatigue, frequent urination, and positive home pregnancy test. UPT is positive.       History reviewed. No pertinent past medical history.  History reviewed. No pertinent surgical history.  Social History     Socioeconomic History    Marital status: Significant Other   Tobacco Use    Smoking status: Never    Smokeless tobacco: Never   Substance and Sexual Activity    Alcohol use: Not Currently     Comment: Not currently    Drug use: Never    Sexual activity: Yes     Partners: Male     Birth control/protection: None     Family History   Problem Relation Name Age of Onset    Breast cancer Neg Hx      Colon cancer Neg Hx      Ovarian cancer Neg Hx       OB History    Para Term  AB Living   2 0 0 0 1 0   SAB IAB Ectopic Multiple Live Births   1 0 0 0 0      # Outcome Date GA Lbr Js/2nd Weight Sex Type Anes PTL Lv   2 Current            1 2024                 ROS:   Review of Systems   Constitutional:  Negative for chills, diaphoresis, fatigue, fever and unexpected weight change.   HENT:  Negative for congestion, hearing loss, postnasal drip, rhinorrhea, sinus pressure, sinus pain, sore throat and tinnitus.    Eyes:  Negative for pain, discharge and visual disturbance.   Respiratory:  Negative for apnea, cough, shortness of breath and wheezing.    Cardiovascular:  Negative for chest pain, palpitations and leg swelling.   Gastrointestinal:  Negative for abdominal pain, constipation, diarrhea, nausea and vomiting.   Endocrine: Negative for cold intolerance, heat intolerance, polydipsia, polyphagia and polyuria.   Genitourinary:  Positive for frequency.  Negative for difficulty urinating, dyspareunia, dysuria, enuresis, flank pain, genital sores, hematuria, menstrual problem, pelvic pain, urgency, vaginal bleeding, vaginal discharge and vaginal pain.   Musculoskeletal:  Negative for arthralgias, back pain, joint swelling, myalgias, neck pain and neck stiffness.   Skin:  Negative for color change, pallor and rash.   Allergic/Immunologic: Negative for environmental allergies, food allergies and immunocompromised state.   Neurological:  Negative for dizziness, weakness, light-headedness, numbness and headaches.   Hematological:  Negative for adenopathy. Does not bruise/bleed easily.   Psychiatric/Behavioral:  Negative for agitation and confusion. The patient is not nervous/anxious.            Objective:   Vital Signs:  Vitals:    09/16/24 1000   BP: 104/78   Pulse: 67       Physical Exam:  General:  alert,normal appearing gravid female   Head: Normocephalic, atraumatic   Neck: Supple, Normal ROM   Respiratory: Normal effort   Neuro/Psych: Alert and oriented, appropriate mood and affect   Abdomen:  soft, non-tender; bowel sounds normal   Pelvis: External genitalia: normal general appearance  Urinary system: urethral meatus normal, bladder nontender  Vaginal: normal mucosa without prolapse or lesions  Cervix: normal appearance  Uterus: normal size, shape, position  Adnexa: normal size, nontender bilaterally       Assessment:      1. Pregnancy examination or test, positive result    2. Amenorrhea    3. Urinary urgency    4. Screening examination for venereal disease          Plan:        Pregnancy examination or test, positive result  -     Type & Screen - Ob Profile; Future; Expected date: 09/16/2024  -     CBC Auto Differential; Future; Expected date: 09/16/2024  -     Hepatitis B Surface Antigen; Future; Expected date: 09/16/2024  -     Rubella Antibody, IgG; Future; Expected date: 09/16/2024  -     Treponema Pallidium Antibodies IgG, IgM; Future; Expected date:  09/16/2024  -     US OB/GYN Procedure ([facility]) - Extended List; Future; Expected date: 09/30/2024  -     Hepatitis C Antibody; Future; Expected date: 09/16/2024  -     HIV 1/2 Ag/Ab (4th Gen); Future; Expected date: 09/16/2024    Amenorrhea  -     POCT Urine Pregnancy    Urinary urgency  -     CULTURE, URINE    Screening examination for venereal disease  -     C. trachomatis/N. gonorrhoeae by AMP DNA [facility]; Cervix        1. Pap up to date  2. Prenatal labs ordered and GC/CT performed.  3. Schedule Ultrasound  4. Follow up in 4 weeks after ultrasound.    Patient was counseled today on proper weight gain based on the [facility]'s recommendations based on her pre-pregnancy weight. Discussed foods to avoid in pregnancy (i.e. sushi, fish that are high in mercury, deli meat, and unpasteurized cheeses). Discussed prenatal vitamin options (i.e. stool softener, DHA). She was also counseled on safe, healthy behavior as well as medications safe in pregnancy.

## 2024-09-17 LAB
BACTERIA UR CULT: NO GROWTH
RUBV IGG SER-ACNC: <5 IU/ML
RUBV IGG SER-IMP: ABNORMAL

## 2024-09-26 ENCOUNTER — PROCEDURE VISIT (OUTPATIENT)
Dept: MATERNAL FETAL MEDICINE | Facility: CLINIC | Age: 24
End: 2024-09-26
Payer: MEDICAID

## 2024-09-26 ENCOUNTER — TELEPHONE (OUTPATIENT)
Dept: OBSTETRICS AND GYNECOLOGY | Facility: CLINIC | Age: 24
End: 2024-09-26
Payer: MEDICAID

## 2024-09-26 ENCOUNTER — LAB VISIT (OUTPATIENT)
Dept: LAB | Facility: HOSPITAL | Age: 24
End: 2024-09-26
Attending: STUDENT IN AN ORGANIZED HEALTH CARE EDUCATION/TRAINING PROGRAM
Payer: MEDICAID

## 2024-09-26 DIAGNOSIS — Z32.01 POSITIVE PREGNANCY TEST: Primary | ICD-10-CM

## 2024-09-26 DIAGNOSIS — Z32.01 POSITIVE PREGNANCY TEST: ICD-10-CM

## 2024-09-26 DIAGNOSIS — Z32.01 PREGNANCY EXAMINATION OR TEST, POSITIVE RESULT: ICD-10-CM

## 2024-09-26 LAB — HCG INTACT+B SERPL-ACNC: NORMAL MIU/ML

## 2024-09-26 PROCEDURE — 84702 CHORIONIC GONADOTROPIN TEST: CPT | Performed by: STUDENT IN AN ORGANIZED HEALTH CARE EDUCATION/TRAINING PROGRAM

## 2024-09-26 PROCEDURE — 36415 COLL VENOUS BLD VENIPUNCTURE: CPT | Performed by: STUDENT IN AN ORGANIZED HEALTH CARE EDUCATION/TRAINING PROGRAM

## 2024-09-26 PROCEDURE — 76801 OB US < 14 WKS SINGLE FETUS: CPT | Mod: PBBFAC | Performed by: OBSTETRICS & GYNECOLOGY

## 2024-09-26 NOTE — TELEPHONE ENCOUNTER
----- Message from Nidia Madden sent at 9/26/2024  3:12 PM CDT -----  Contact: Self  Manuel Davies  MRN: [id number]  Home Phone      Not on file.  Work Phone      Not on file.  Mobile          [phone]    Patient Care Team:  No, Primary Doctor as PCP - Linn West MD as Consulting Physician (Obstetrics and Gynecology)  OB? Yes, Unknown  What phone number can you be reached at? [phone]  Message: calling in regards to results

## 2024-09-27 DIAGNOSIS — O36.80X0 PREGNANCY WITH INCONCLUSIVE FETAL VIABILITY, SINGLE OR UNSPECIFIED FETUS: Primary | ICD-10-CM

## 2024-09-28 ENCOUNTER — LAB VISIT (OUTPATIENT)
Dept: LAB | Facility: HOSPITAL | Age: 24
End: 2024-09-28
Attending: STUDENT IN AN ORGANIZED HEALTH CARE EDUCATION/TRAINING PROGRAM
Payer: MEDICAID

## 2024-09-28 DIAGNOSIS — Z32.01 POSITIVE PREGNANCY TEST: ICD-10-CM

## 2024-09-28 LAB — HCG INTACT+B SERPL-ACNC: NORMAL MIU/ML

## 2024-09-28 PROCEDURE — 36415 COLL VENOUS BLD VENIPUNCTURE: CPT | Performed by: STUDENT IN AN ORGANIZED HEALTH CARE EDUCATION/TRAINING PROGRAM

## 2024-09-28 PROCEDURE — 84702 CHORIONIC GONADOTROPIN TEST: CPT | Performed by: STUDENT IN AN ORGANIZED HEALTH CARE EDUCATION/TRAINING PROGRAM

## 2024-09-30 ENCOUNTER — TELEPHONE (OUTPATIENT)
Dept: OBSTETRICS AND GYNECOLOGY | Facility: CLINIC | Age: 24
End: 2024-09-30
Payer: MEDICAID

## 2024-09-30 NOTE — TELEPHONE ENCOUNTER
Pt called requesting results of her hcg lab results, Pt informed that we will give her a call with the result tomorrow, pls advise.

## 2024-09-30 NOTE — TELEPHONE ENCOUNTER
----- Message from Mariama Salazar MD sent at 9/30/2024  9:17 AM CDT -----  Dennis Olvera,    Can we please have the patient come back in the same day she has a repeat scan to see Kellin due to likely miscarriage. I spoke with the patient in person, but repeat imaging was recommended.    Thanks,   Cailin  ----- Message -----  From: Stanislav, [facility]  Sent: 9/28/2024  12:37 PM CDT  To: Mariama HOOKER MD

## 2024-09-30 NOTE — TELEPHONE ENCOUNTER
----- Message from Med Assistant Gamble sent at 9/30/2024  4:51 PM CDT -----  Contact: self  Manuel Davies  MRN: [id number]  Home Phone      Not on file.  Work Phone      Not on file.  Mobile          [phone]    Patient Care Team:  No, Primary Doctor as PCP - Linn West MD as Consulting Physician (Obstetrics and Gynecology)  OB? No  What phone number can you be reached at? [phone]  Message: Would like HCG results.

## 2024-10-01 NOTE — TELEPHONE ENCOUNTER
Contact pt using language line and was disconnected. Pt called back with with  Latonya ID# [id number]. Pt was informed of results and Dr. Chaidez recommendations. Pt made aware that due to HCG declining this is a miscarriage. Pt desires to keep appt for ultrasound and discuss results with Dr. Chaidez after. Pt voiced understanding.

## 2024-10-01 NOTE — TELEPHONE ENCOUNTER
Please let Manuel know that her pregnancy hormone decreased.  She had an ultrasound and the baby was measuring smaller than expected and a heartbeat could not be seen at that time.  Based on the ultrasound, the plan had been to follow up this Friday with a repeat ultrasound to look for fetal growth and cardiac activity.  However, since the pregnancy hormone is declining, this is evidence that the pregnancy is not normal and is a miscarriage. We can either keep her scheduled ultrasound if she would like that confirmation ultrasound Friday, and then I will follow up after the ultrasound on Friday, or I can see her before then to discuss miscarriage management.

## 2024-10-04 ENCOUNTER — OFFICE VISIT (OUTPATIENT)
Dept: OBSTETRICS AND GYNECOLOGY | Facility: CLINIC | Age: 24
End: 2024-10-04
Payer: MEDICAID

## 2024-10-04 ENCOUNTER — PROCEDURE VISIT (OUTPATIENT)
Dept: MATERNAL FETAL MEDICINE | Facility: CLINIC | Age: 24
End: 2024-10-04
Payer: MEDICAID

## 2024-10-04 VITALS
BODY MASS INDEX: 21.37 KG/M2 | WEIGHT: 141 LBS | HEART RATE: 63 BPM | DIASTOLIC BLOOD PRESSURE: 76 MMHG | HEIGHT: 68 IN | SYSTOLIC BLOOD PRESSURE: 120 MMHG

## 2024-10-04 DIAGNOSIS — O02.1 MISSED ABORTION: Primary | ICD-10-CM

## 2024-10-04 DIAGNOSIS — O03.9 MISCARRIAGE: ICD-10-CM

## 2024-10-04 DIAGNOSIS — O36.80X0 PREGNANCY WITH INCONCLUSIVE FETAL VIABILITY, SINGLE OR UNSPECIFIED FETUS: ICD-10-CM

## 2024-10-04 PROCEDURE — 99213 OFFICE O/P EST LOW 20 MIN: CPT | Mod: PBBFAC,TH | Performed by: OBSTETRICS & GYNECOLOGY

## 2024-10-04 PROCEDURE — 99999 PR PBB SHADOW E&M-EST. PATIENT-LVL III: CPT | Mod: PBBFAC,,, | Performed by: OBSTETRICS & GYNECOLOGY

## 2024-10-04 PROCEDURE — 76801 OB US < 14 WKS SINGLE FETUS: CPT | Mod: PBBFAC | Performed by: OBSTETRICS & GYNECOLOGY

## 2024-10-04 PROCEDURE — 76817 TRANSVAGINAL US OBSTETRIC: CPT | Mod: 26,S$PBB,, | Performed by: OBSTETRICS & GYNECOLOGY

## 2024-10-04 RX ORDER — HYDROCODONE BITARTRATE AND ACETAMINOPHEN 5; 325 MG/1; MG/1
1 TABLET ORAL EVERY 6 HOURS PRN
Qty: 5 TABLET | Refills: 0 | Status: SHIPPED | OUTPATIENT
Start: 2024-10-04 | End: 2024-10-14

## 2024-10-04 RX ORDER — ONDANSETRON 4 MG/1
4 TABLET, ORALLY DISINTEGRATING ORAL EVERY 6 HOURS PRN
Qty: 10 TABLET | Refills: 0 | Status: SHIPPED | OUTPATIENT
Start: 2024-10-04 | End: 2024-11-03

## 2024-10-04 RX ORDER — MISOPROSTOL 200 UG/1
400 TABLET ORAL EVERY 4 HOURS
Qty: 10 TABLET | Refills: 0 | Status: SHIPPED | OUTPATIENT
Start: 2024-10-04 | End: 2024-10-05

## 2024-10-04 NOTE — PROGRESS NOTES
Subjective:       Patient ID: Zkatelyn Padminililo Davies is a 24 y.o. female.    Chief Complaint:  Follow-up (sab)      History of Present Illness  HPI  Pt here for follow up of miscarriage.  Pt with ultrasound last week concerning for miscarriage.  She subsequently had declining hcg levels but wanted to follow up today for ultrasound confirmation of miscarriage.    She denies cramping or bleeding.     Ultrasound today confirms miscarriage.  Gestational sac measures 8 WGA with now no fetal pole present.    GYN & OB History  Patient's last menstrual period was 2024 (exact date).   Date of Last Pap: 3/11/2024    OB History    Para Term  AB Living   2 0 0 0 1 0   SAB IAB Ectopic Multiple Live Births   1 0 0 0 0      # Outcome Date GA Lbr Js/2nd Weight Sex Type Anes PTL Lv   2 Current            1 SAB 2024               Review of Systems  Review of Systems   Constitutional:  Negative for chills, diaphoresis, fatigue, fever and unexpected weight change.   HENT:  Negative for congestion, hearing loss, postnasal drip, rhinorrhea, sinus pressure, sinus pain, sore throat and tinnitus.    Eyes:  Negative for pain, discharge and visual disturbance.   Respiratory:  Negative for apnea, cough, shortness of breath and wheezing.    Cardiovascular:  Negative for chest pain, palpitations and leg swelling.   Gastrointestinal:  Negative for abdominal pain, constipation, diarrhea, nausea and vomiting.   Endocrine: Negative for cold intolerance, heat intolerance, polydipsia, polyphagia and polyuria.   Genitourinary:  Negative for difficulty urinating, dyspareunia, dysuria, enuresis, flank pain, frequency, genital sores, hematuria, menstrual problem, pelvic pain, urgency, vaginal bleeding, vaginal discharge and vaginal pain.   Musculoskeletal:  Negative for arthralgias, back pain, joint swelling, myalgias, neck pain and neck stiffness.   Skin:  Negative for color change, pallor and rash.  "  Allergic/Immunologic: Negative for environmental allergies, food allergies and immunocompromised state.   Neurological:  Negative for dizziness, weakness, light-headedness, numbness and headaches.   Hematological:  Negative for adenopathy. Does not bruise/bleed easily.   Psychiatric/Behavioral:  Negative for agitation and confusion. The patient is not nervous/anxious.            Objective:    Physical Exam:   Constitutional: She is oriented to person, place, and time. She appears well-developed and well-nourished. No distress.    HENT:   Head: Normocephalic and atraumatic.    Eyes: Conjunctivae and EOM are normal.      Pulmonary/Chest: Effort normal. No respiratory distress.                  Musculoskeletal: Normal range of motion.       Neurological: She is alert and oriented to person, place, and time.    Skin: Skin is warm and dry.    Psychiatric: She has a normal mood and affect. Her behavior is normal. Judgment and thought content normal.          Assessment:        1. Missed     2. Miscarriage               Plan:      Manuel Benavides" was seen today for follow-up.    Diagnoses and all orders for this visit:    Missed   -     ondansetron (ZOFRAN-ODT) 4 MG TbDL; Take 1 tablet (4 mg total) by mouth every 6 (six) hours as needed (Nausea).  -     HYDROcodone-acetaminophen (NORCO) 5-325 mg per tablet; Take 1 tablet by mouth every 6 (six) hours as needed for Pain.  -     miSOPROStoL (CYTOTEC) 200 MCG Tab; Place 2 tablets (400 mcg total) inside cheek every 4 (four) hours. for 5 doses    Miscarriage  -     ondansetron (ZOFRAN-ODT) 4 MG TbDL; Take 1 tablet (4 mg total) by mouth every 6 (six) hours as needed (Nausea).  -     HYDROcodone-acetaminophen (NORCO) 5-325 mg per tablet; Take 1 tablet by mouth every 6 (six) hours as needed for Pain.  -     miSOPROStoL (CYTOTEC) 200 MCG Tab; Place 2 tablets (400 mcg total) inside cheek every 4 (four) hours. for 5 doses      Confirmation of miscarriage " discussed with patient.     We discussed all management options including expectant, medical, or surgical options.   She desires medical management.  Instructions on taking the medication discussed.  Precautions and expectations discussed.

## 2024-10-07 ENCOUNTER — HOSPITAL ENCOUNTER (EMERGENCY)
Facility: HOSPITAL | Age: 24
Discharge: HOME | End: 2024-10-07
Attending: SURGERY
Payer: MEDICAID

## 2024-10-07 DIAGNOSIS — O03.9 MISCARRIAGE: Primary | ICD-10-CM

## 2024-10-07 DIAGNOSIS — O02.1 MISSED ABORTION: ICD-10-CM

## 2024-10-07 LAB
ALBUMIN SERPL BCP-MCNC: 3.7 G/DL (ref 3.5–5.2)
ALP SERPL-CCNC: 46 U/L (ref 55–135)
ALT SERPL W/O P-5'-P-CCNC: 14 U/L (ref 10–44)
ANION GAP SERPL CALC-SCNC: 8 MMOL/L (ref 8–16)
AST SERPL-CCNC: 21 U/L (ref 10–40)
BACTERIA #/AREA URNS HPF: ABNORMAL /HPF
BASOPHILS # BLD AUTO: 0.04 K/UL (ref 0–0.2)
BASOPHILS NFR BLD: 0.4 % (ref 0–1.9)
BILIRUB SERPL-MCNC: 0.3 MG/DL (ref 0.1–1)
BILIRUB UR QL STRIP: ABNORMAL
BUN SERPL-MCNC: 10 MG/DL (ref 6–20)
CALCIUM SERPL-MCNC: 9.2 MG/DL (ref 8.7–10.5)
CHLORIDE SERPL-SCNC: 108 MMOL/L (ref 95–110)
CLARITY UR: ABNORMAL
CO2 SERPL-SCNC: 22 MMOL/L (ref 23–29)
COLOR UR: ABNORMAL
CREAT SERPL-MCNC: 0.6 MG/DL (ref 0.5–1.4)
DIFFERENTIAL METHOD BLD: ABNORMAL
EOSINOPHIL # BLD AUTO: 0.1 K/UL (ref 0–0.5)
EOSINOPHIL NFR BLD: 0.6 % (ref 0–8)
ERYTHROCYTE [DISTWIDTH] IN BLOOD BY AUTOMATED COUNT: 13.2 % (ref 11.5–14.5)
EST. GFR  (NO RACE VARIABLE): >60 ML/MIN/1.73 M^2
GLUCOSE SERPL-MCNC: 90 MG/DL (ref 70–110)
GLUCOSE UR QL STRIP: NEGATIVE
HCT VFR BLD AUTO: 29.4 % (ref 37–48.5)
HGB BLD-MCNC: 10 G/DL (ref 12–16)
HGB UR QL STRIP: ABNORMAL
HYALINE CASTS #/AREA URNS LPF: 0 /LPF
IMM GRANULOCYTES # BLD AUTO: 0.03 K/UL (ref 0–0.04)
IMM GRANULOCYTES NFR BLD AUTO: 0.3 % (ref 0–0.5)
KETONES UR QL STRIP: ABNORMAL
LEUKOCYTE ESTERASE UR QL STRIP: NEGATIVE
LYMPHOCYTES # BLD AUTO: 2.1 K/UL (ref 1–4.8)
LYMPHOCYTES NFR BLD: 23.1 % (ref 18–48)
MCH RBC QN AUTO: 30 PG (ref 27–31)
MCHC RBC AUTO-ENTMCNC: 34 G/DL (ref 32–36)
MCV RBC AUTO: 88 FL (ref 82–98)
MICROSCOPIC COMMENT: ABNORMAL
MONOCYTES # BLD AUTO: 0.7 K/UL (ref 0.3–1)
MONOCYTES NFR BLD: 8 % (ref 4–15)
NEUTROPHILS # BLD AUTO: 6.1 K/UL (ref 1.8–7.7)
NEUTROPHILS NFR BLD: 67.6 % (ref 38–73)
NITRITE UR QL STRIP: POSITIVE
NRBC BLD-RTO: 0 /100 WBC
PH UR STRIP: 5 [PH] (ref 5–8)
PLATELET # BLD AUTO: 269 K/UL (ref 150–450)
PMV BLD AUTO: 9.5 FL (ref 9.2–12.9)
POTASSIUM SERPL-SCNC: 3.7 MMOL/L (ref 3.5–5.1)
PROT SERPL-MCNC: 6.6 G/DL (ref 6–8.4)
PROT UR QL STRIP: ABNORMAL
RBC # BLD AUTO: 3.33 M/UL (ref 4–5.4)
RBC #/AREA URNS HPF: >100 /HPF (ref 0–4)
SODIUM SERPL-SCNC: 138 MMOL/L (ref 136–145)
SP GR UR STRIP: >=1.03 (ref 1–1.03)
SQUAMOUS #/AREA URNS HPF: 2 /HPF
URN SPEC COLLECT METH UR: ABNORMAL
UROBILINOGEN UR STRIP-ACNC: 1 EU/DL
WBC # BLD AUTO: 9.04 K/UL (ref 3.9–12.7)
WBC #/AREA URNS HPF: 3 /HPF (ref 0–5)

## 2024-10-07 PROCEDURE — 99284 EMERGENCY DEPT VISIT MOD MDM: CPT | Mod: 25

## 2024-10-07 PROCEDURE — 80053 COMPREHEN METABOLIC PANEL: CPT | Performed by: NURSE PRACTITIONER

## 2024-10-07 PROCEDURE — 81000 URINALYSIS NONAUTO W/SCOPE: CPT | Performed by: NURSE PRACTITIONER

## 2024-10-07 PROCEDURE — 85025 COMPLETE CBC W/AUTO DIFF WBC: CPT | Performed by: NURSE PRACTITIONER

## 2024-10-07 RX ORDER — HYDROCODONE BITARTRATE AND ACETAMINOPHEN 5; 325 MG/1; MG/1
1 TABLET ORAL EVERY 6 HOURS PRN
Qty: 10 TABLET | Refills: 0 | Status: SHIPPED | OUTPATIENT
Start: 2024-10-07 | End: 2024-10-17

## 2024-10-08 VITALS
TEMPERATURE: 99 F | HEART RATE: 77 BPM | HEIGHT: 68 IN | DIASTOLIC BLOOD PRESSURE: 59 MMHG | OXYGEN SATURATION: 100 % | SYSTOLIC BLOOD PRESSURE: 98 MMHG | WEIGHT: 140.19 LBS | BODY MASS INDEX: 21.25 KG/M2 | RESPIRATION RATE: 15 BRPM

## 2024-10-08 NOTE — ED PROVIDER NOTES
Encounter Date: 10/7/2024       History     Chief Complaint   Patient presents with    Miscarriage     Patient seen by Dr. aFrley on Friday and told she was having a miscarriage.  States she was given a pill and now is having a lot of clots.  Reports pain in left lower abdomen.     Chief complaint: Vaginal bleeding cramping   24-year-old female presents to be evaluated for vaginal bleeding and cramping after recently experiencing miscarriage.  She reports she was given Cytotec 3 days ago.  Reports that she had some initial bleeding and cramping however he got heavier earlier today.  She reports passing some clots.  She reports 8/10 aching and cramping.  Denies fever body aches or chills    The history is provided by the patient. A  was used.     Review of patient's allergies indicates:  No Known Allergies    History reviewed. No pertinent past medical history.  History reviewed. No pertinent surgical history.    Family History   Problem Relation Name Age of Onset    Breast cancer Neg Hx      Colon cancer Neg Hx      Ovarian cancer Neg Hx       Social History     Tobacco Use    Smoking status: Never    Smokeless tobacco: Never   Substance Use Topics    Alcohol use: Not Currently     Comment: Not currently    Drug use: Never     Review of Systems   Constitutional: Negative.  Negative for fatigue and fever.   HENT: Negative.     Eyes: Negative.    Respiratory: Negative.     Cardiovascular: Negative.    Gastrointestinal: Negative.    Genitourinary:  Positive for pelvic pain and vaginal bleeding. Negative for hematuria.   Musculoskeletal: Negative.    Skin: Negative.    Neurological: Negative.    Psychiatric/Behavioral: Negative.         Physical Exam     Initial Vitals [10/07/24 1942]   BP Pulse Resp Temp SpO2   120/67 85 16 98.9 °F (37.2 °C) 98 %      MAP       --         Physical Exam    Nursing note and vitals reviewed.  Constitutional: Vital signs are normal. She appears well-developed and  well-nourished. She is cooperative.   HENT:   Head: Normocephalic and atraumatic.   Eyes: Conjunctivae, EOM and lids are normal. Pupils are equal, round, and reactive to light.   Neck: Trachea normal and phonation normal. Neck supple. No JVD present.   Normal range of motion.   Full passive range of motion without pain.     Cardiovascular:  Normal rate, regular rhythm, S1 normal, S2 normal, normal heart sounds, intact distal pulses and normal pulses.           Pulmonary/Chest: Effort normal and breath sounds normal.   Abdominal: Abdomen is soft and flat. Bowel sounds are normal.   Genitourinary:    Genitourinary Comments: Blood vaginal vault with some clots     Musculoskeletal:         General: Normal range of motion.      Cervical back: Full passive range of motion without pain, normal range of motion and neck supple.     Neurological: She is alert and oriented to person, place, and time. She has normal strength.   Skin: Skin is warm, dry and intact. Capillary refill takes less than 2 seconds.   Psychiatric: She has a normal mood and affect. Thought content normal.         ED Course   Procedures  Labs Reviewed   CBC W/ AUTO DIFFERENTIAL - Abnormal       Result Value    WBC 9.04      RBC 3.33 (*)     Hemoglobin 10.0 (*)     Hematocrit 29.4 (*)     MCV 88      MCH 30.0      MCHC 34.0      RDW 13.2      Platelets 269      MPV 9.5      Immature Granulocytes 0.3      Gran # (ANC) 6.1      Immature Grans (Abs) 0.03      Lymph # 2.1      Mono # 0.7      Eos # 0.1      Baso # 0.04      nRBC 0      Gran % 67.6      Lymph % 23.1      Mono % 8.0      Eosinophil % 0.6      Basophil % 0.4      Differential Method Automated     COMPREHENSIVE METABOLIC PANEL - Abnormal    Sodium 138      Potassium 3.7      Chloride 108      CO2 22 (*)     Glucose 90      BUN 10      Creatinine 0.6      Calcium 9.2      Total Protein 6.6      Albumin 3.7      Total Bilirubin 0.3      Alkaline Phosphatase 46 (*)     AST 21      ALT 14      eGFR >60       Anion Gap 8     URINALYSIS, REFLEX TO URINE CULTURE - Abnormal    Specimen UA Urine, Clean Catch      Color, UA Red (*)     Appearance, UA Cloudy (*)     pH, UA 5.0      Specific Gravity, UA >=1.030 (*)     Protein, UA 3+ (*)     Glucose, UA Negative      Ketones, UA 3+ (*)     Bilirubin (UA) 2+ (*)     Occult Blood UA 3+ (*)     Nitrite, UA Positive (*)     Urobilinogen, UA 1.0      Leukocytes, UA Negative      Narrative:     Specimen Source->Urine   URINALYSIS MICROSCOPIC - Abnormal    RBC, UA >100 (*)     WBC, UA 3      Bacteria Rare      Squam Epithel, UA 2      Hyaline Casts, UA 0      Microscopic Comment SEE COMMENT      Narrative:     Specimen Source->Urine          Imaging Results              US OB <14 Wks, TransAbd, Single Gestation (Final result)  Result time 10/07/24 21:55:38      Final result by Nelson Obando DO (10/07/24 21:55:38)                   Impression:      Endometrial thickening and heterogeneity with hypervascularity, compatible with retained products of conception.      Electronically signed by: Nelson Obando  Date:    10/07/2024  Time:    21:55               Narrative:    EXAMINATION:  US OB <14 WEEKS TRANSABDOM, SINGLE GESTATION    CLINICAL HISTORY:  Miscarriage on Cytotec, evaluate for retained products;    TECHNIQUE:  Transabdominal pelvic ultrasound was performed.  Transvaginal ultrasound was not performed.    COMPARISON:  Ob ultrasound from 03/23/2024.    FINDINGS:  There is heterogeneous endometrial thickening with hypervascularity, compatible with retained products of conception.  The uterus measures 9.3 x 5.4 x 5.9 cm.  Endometrial thickness measures 2.9 cm.  The right ovary measures 2.7 x 2.9 x 1.6 cm, unremarkable appearance with normal vascular flow.  The left ovary measures 3.1 x 2.3 x 1.9 cm, unremarkable in appearance with normal vascular flow.                                       Medications - No data to display    Medical Decision Making  24 year female with  vaginal bleeding & cramping after recent miscarriage  Discharge diagnoses include retained products of conception, vaginal bleeding    Problems Addressed:  Miscarriage: complicated acute illness or injury    Amount and/or Complexity of Data Reviewed  Labs: ordered. Decision-making details documented in ED Course.  Radiology: ordered and independent interpretation performed.    Risk  Risk Details: Patient with abdominal tenderness   Pelvic exam showed moderate blood vaginal vault with some clotted tissue  Stable CBC  US pending    ED Management & Risks of Complication, Morbidity, & Mortality:  I took over this patient with the nurse practitioner 9:00 p.m. shift change  Clotting & abdominal cramping after taking Cytotec for miscarriage  Lab work stable, ultrasound shows possible retained products  Pelvic exam by nurse practitioner showed minimal bleeding  Pain control in the ER with a refill of Norco on discharge  Follow-up with OBGYN Dr. Chaidez as outpatient on discharge  I will contact Dr. Chaidez via epic for close follow-up in the clinic  Bleeding precautions given, return with any concerns on discharge    Clinical Impression:  Final diagnoses:  [O03.9] Miscarriage (Primary)          ED Disposition Condition    Discharge Stable          ED Prescriptions       Medication Sig Dispense Start Date End Date Auth. Provider    HYDROcodone-acetaminophen (NORCO) 5-325 mg per tablet Take 1 tablet by mouth every 6 (six) hours as needed for Pain. 10 tablet 10/7/2024 10/17/2024 Cristino Felton MD          Follow-up Information       Follow up With Specialties Details Why Contact Info    Linn Chaidez MD Obstetrics and Gynecology Go in 2 days  [address] [place] [address]  [phone]               Cristino Felton MD  10/07/24 [id number]

## 2024-10-16 ENCOUNTER — POSTPARTUM VISIT (OUTPATIENT)
Dept: OBSTETRICS AND GYNECOLOGY | Facility: CLINIC | Age: 24
End: 2024-10-16
Payer: MEDICAID

## 2024-10-16 ENCOUNTER — TELEPHONE (OUTPATIENT)
Dept: RADIOLOGY | Facility: HOSPITAL | Age: 24
End: 2024-10-16
Payer: MEDICAID

## 2024-10-16 VITALS
HEIGHT: 68 IN | BODY MASS INDEX: 21.22 KG/M2 | SYSTOLIC BLOOD PRESSURE: 102 MMHG | WEIGHT: 140 LBS | HEART RATE: 80 BPM | DIASTOLIC BLOOD PRESSURE: 70 MMHG

## 2024-10-16 DIAGNOSIS — N63.10 MASS OF RIGHT BREAST, UNSPECIFIED QUADRANT: ICD-10-CM

## 2024-10-16 DIAGNOSIS — O03.9 COMPLETE ABORTION WITHOUT COMPLICATION: Primary | ICD-10-CM

## 2024-10-16 DIAGNOSIS — N96 HISTORY OF RECURRENT MISCARRIAGES: ICD-10-CM

## 2024-10-16 PROCEDURE — 99213 OFFICE O/P EST LOW 20 MIN: CPT | Mod: PBBFAC,TH | Performed by: OBSTETRICS & GYNECOLOGY

## 2024-10-16 PROCEDURE — 99999 PR PBB SHADOW E&M-EST. PATIENT-LVL III: CPT | Mod: PBBFAC,,, | Performed by: OBSTETRICS & GYNECOLOGY

## 2024-10-16 NOTE — PROGRESS NOTES
Subjective:       Patient ID: Manuel Davies is a 24 y.o. female.    Chief Complaint:  Follow-up (Sab, pt states she is still spotting but no pain)      History of Present Illness  Follow-up  Pertinent negatives include no abdominal pain, arthralgias, chest pain, chills, congestion, coughing, diaphoresis, fatigue, fever, headaches, joint swelling, myalgias, nausea, neck pain, numbness, rash, sore throat, vomiting or weakness.     Pt here for follow up after miscarriage.  She completed Cytotec and started bleeding heavy with passage of clots and tissue.  She was seen in the ER with no GS on US but still with some retained POC.  She reports heavy bleeding and passage of clots for another 2 days that has now resolved.  She has no cramping now and only light spotting.     She has h/o miscarriage x2 with no living children and is interested in a work up for recurrent pregnancy loss.     She is also due for a 6m breast ultrasound follow up.      GYN & OB History  Patient's last menstrual period was 2024 (exact date).   Date of Last Pap: 3/11/2024    OB History    Para Term  AB Living   2 0 0 0 2 0   SAB IAB Ectopic Multiple Live Births   2 0 0 0 0      # Outcome Date GA Lbr Js/2nd Weight Sex Type Anes PTL Lv   2 SAB 10/07/24     SAB         Birth Comments: Cytotec   1 2024               Review of Systems  Review of Systems   Constitutional:  Negative for chills, diaphoresis, fatigue, fever and unexpected weight change.   HENT:  Negative for congestion, hearing loss, postnasal drip, rhinorrhea, sinus pressure, sinus pain, sore throat and tinnitus.    Eyes:  Negative for pain, discharge and visual disturbance.   Respiratory:  Negative for apnea, cough, shortness of breath and wheezing.    Cardiovascular:  Negative for chest pain, palpitations and leg swelling.   Gastrointestinal:  Negative for abdominal pain, constipation, diarrhea, nausea and vomiting.   Endocrine: Negative  "for cold intolerance, heat intolerance, polydipsia, polyphagia and polyuria.   Genitourinary:  Positive for vaginal bleeding (spotting). Negative for difficulty urinating, dyspareunia, dysuria, enuresis, flank pain, frequency, genital sores, hematuria, menstrual problem, pelvic pain, urgency, vaginal discharge and vaginal pain.   Musculoskeletal:  Negative for arthralgias, back pain, joint swelling, myalgias, neck pain and neck stiffness.   Skin:  Negative for color change, pallor and rash.   Allergic/Immunologic: Negative for environmental allergies, food allergies and immunocompromised state.   Neurological:  Negative for dizziness, weakness, light-headedness, numbness and headaches.   Hematological:  Negative for adenopathy. Does not bruise/bleed easily.   Psychiatric/Behavioral:  Negative for agitation and confusion. The patient is not nervous/anxious.            Objective:    Physical Exam:   Constitutional: She is oriented to person, place, and time. She appears well-developed and well-nourished. No distress.    HENT:   Head: Normocephalic and atraumatic.    Eyes: Conjunctivae and EOM are normal.      Pulmonary/Chest: Effort normal. No respiratory distress.        Abdominal: There is no abdominal tenderness. There is no rebound and no guarding.             Musculoskeletal: Normal range of motion.       Neurological: She is alert and oriented to person, place, and time.    Skin: Skin is warm and dry.    Psychiatric: She has a normal mood and affect. Her behavior is normal. Judgment and thought content normal.          Assessment:        1. Complete  without complication    2. History of recurrent miscarriages    3. Mass of right breast, unspecified quadrant               Plan:      Manuel Washington "Manuel" was seen today for follow-up.    Diagnoses and all orders for this visit:    Complete  without complication    History of recurrent miscarriages  -     TSH; Future  -     HEMOGLOBIN A1C; " Future  -     ANTIPHOSPHOLIPID AB (ANTICARDIOLIPIN); Future  -     US Transvaginal Non OB; Future  -     LUPUS ANTICOAGULANT (DRVVT); Future  -     Beta-2 Glycoprotein Abs (IgA, IgG, IgM); Future    Mass of right breast, unspecified quadrant  -     US Breast Right Limited; Future

## 2024-10-24 ENCOUNTER — APPOINTMENT (OUTPATIENT)
Dept: RADIOLOGY | Facility: CLINIC | Age: 24
End: 2024-10-24
Attending: OBSTETRICS & GYNECOLOGY
Payer: MEDICAID

## 2024-10-24 DIAGNOSIS — O03.9 MISCARRIAGE: ICD-10-CM

## 2024-10-24 DIAGNOSIS — N96 HISTORY OF RECURRENT MISCARRIAGES: ICD-10-CM

## 2024-10-24 DIAGNOSIS — O02.1 MISSED ABORTION: ICD-10-CM

## 2024-10-24 PROCEDURE — 76830 TRANSVAGINAL US NON-OB: CPT | Mod: 26,,, | Performed by: RADIOLOGY

## 2024-10-24 PROCEDURE — 76830 TRANSVAGINAL US NON-OB: CPT | Mod: TC

## 2024-10-24 RX ORDER — MISOPROSTOL 200 UG/1
400 TABLET ORAL EVERY 4 HOURS
Qty: 10 TABLET | Refills: 0 | Status: SHIPPED | OUTPATIENT
Start: 2024-10-24 | End: 2024-10-25

## 2024-10-29 ENCOUNTER — TELEPHONE (OUTPATIENT)
Dept: OBSTETRICS AND GYNECOLOGY | Facility: CLINIC | Age: 24
End: 2024-10-29
Payer: MEDICAID

## 2024-10-29 DIAGNOSIS — O03.9 MISCARRIAGE: Primary | ICD-10-CM

## 2024-10-30 ENCOUNTER — HOSPITAL ENCOUNTER (OUTPATIENT)
Dept: RADIOLOGY | Facility: HOSPITAL | Age: 24
Discharge: HOME | End: 2024-10-30
Attending: OBSTETRICS & GYNECOLOGY
Payer: MEDICAID

## 2024-10-30 DIAGNOSIS — N63.10 MASS OF RIGHT BREAST, UNSPECIFIED QUADRANT: ICD-10-CM

## 2024-10-30 PROCEDURE — 76642 ULTRASOUND BREAST LIMITED: CPT | Mod: TC,RT

## 2024-10-30 PROCEDURE — 76642 ULTRASOUND BREAST LIMITED: CPT | Mod: 26,RT,, | Performed by: RADIOLOGY

## 2024-10-31 ENCOUNTER — LAB VISIT (OUTPATIENT)
Dept: LAB | Facility: HOSPITAL | Age: 24
End: 2024-10-31
Attending: OBSTETRICS & GYNECOLOGY
Payer: MEDICAID

## 2024-10-31 DIAGNOSIS — O03.9 MISCARRIAGE: ICD-10-CM

## 2024-10-31 LAB — HCG INTACT+B SERPL-ACNC: 29 MIU/ML

## 2024-10-31 PROCEDURE — 84702 CHORIONIC GONADOTROPIN TEST: CPT | Performed by: OBSTETRICS & GYNECOLOGY

## 2024-10-31 PROCEDURE — 36415 COLL VENOUS BLD VENIPUNCTURE: CPT | Performed by: OBSTETRICS & GYNECOLOGY

## 2024-11-04 ENCOUNTER — TELEPHONE (OUTPATIENT)
Dept: OBSTETRICS AND GYNECOLOGY | Facility: CLINIC | Age: 24
End: 2024-11-04
Payer: MEDICAID

## 2024-11-04 NOTE — PROGRESS NOTES
Phone call copied and pasted below.    Pt was contacted with language line  Raymon, ID# [id number]. Pt was informed of breast ultrasound results and to repeat breast ultrasound in 6 months. Pt also informed of HCG continuing to drop appropriately. Pt stated she started with vaginal bleeding this Saturday 11/2/24. Pt complained of passing what looked like a yellow piece of plastic in texture, but period is like a regular period flow. Pt to send picture through portal. Would you like to count this bleeding as her first period?

## 2024-11-04 NOTE — TELEPHONE ENCOUNTER
----- Message from Med Assistant Gamble sent at 11/4/2024  9:47 AM CST -----  Contact: self  Manuel Davies  MRN: [id number]  Home Phone      Not on file.  Work Phone      Not on file.  Mobile          [phone]    Patient Care Team:  No, Primary Doctor as PCP - Linn West MD as Consulting Physician (Obstetrics and Gynecology)  OB? No  What phone number can you be reached at? [phone]  Message: Needs to make appt for f/u miscarriage.

## 2024-11-05 NOTE — PROGRESS NOTES
Pt was called and informed to not count this as her cycle, but contact clinic with her next cycle so appt can be made. Pt voiced understanding.

## 2025-01-28 ENCOUNTER — TELEPHONE (OUTPATIENT)
Dept: OBSTETRICS AND GYNECOLOGY | Facility: CLINIC | Age: 25
End: 2025-01-28
Payer: MEDICAID

## 2025-01-28 NOTE — TELEPHONE ENCOUNTER
Pt was called using language line  Amy ID# [id number]. Pt desires appt to discuss surgery that was recommended due to ultrasound results. Appt scheduled and pt voiced understanding.

## 2025-02-27 ENCOUNTER — PATIENT MESSAGE (OUTPATIENT)
Dept: OBSTETRICS AND GYNECOLOGY | Facility: CLINIC | Age: 25
End: 2025-02-27

## 2025-02-27 ENCOUNTER — CLINICAL SUPPORT (OUTPATIENT)
Dept: OBSTETRICS AND GYNECOLOGY | Facility: CLINIC | Age: 25
End: 2025-02-27
Payer: MEDICAID

## 2025-02-27 DIAGNOSIS — N91.2 ABSENT MENSES: Primary | ICD-10-CM

## 2025-02-27 NOTE — PROGRESS NOTES
Spoke with patient for approximately 60 minutes during OB navigator virtual visit. Used  Daja [id number]. Updated chart to reflect up to date patient demographics.  Allergies, medications, pharmacy, medical, surgical and family history updated. Substance and sexual activity, marital status, gender identity and OB/Gyn history updated.   Patient was guided through expectations of care during pregnancy. Pregnancy confirmation, dating u/s & first OB appts scheduled.  New pregnancy education provided & questions answered. Encouraged to send message or call office with any questions/concerns. Verbalized understanding.     Discussed with pt:    Lmp 12/8/24; currently 11 weeks 4 days; PAULETTE 9/14/25; + UPT in January 2025  Encouraged to begin taking PNV daily  Denies n/v:   common in 1st tri  discussed safe options per Pregnancy A to Z/KRISTIN guide  C/o small cramping when gets out of bed in the morning (does not interrupt daily activity)/spotting:   may be normal to have mild cramping/light spotting, lien in 1st tri & with sexual activity  Precautions/warning signs discussed:   encouraged to go to ED for excessive vag d/c, saturating a pad, bright red bleeding, painful cramping worse than menstrual cramps/abd pain that is interrupting daily activity  Encouraged and discussed healthy diet:   nothing raw; meat well done; heat deli meats & hot dogs prior to eating   avoid soft cheeses that are not fully pasteurized-ex: brie, feta, blue cheese   avoid fish high in mercury-limit canned tuna to once per week   rinse fruits/veggies thoroughly    Encouraged adequate fluids:  8-12 cups of water/healthy liquids each day  Limit caffeine to <200 mg/day (approx 1-2 cups coffee, tea or soda)   Extra rest can be beneficial, especially during the 1st trimester when it's normal to feel tired   Ok to continue to exercise but should not start anything new or more strenuous; No Hot Yoga or Sauna.   Normal weight gain:  1st trimester-may  not gain anything at all or up to 5 lbs   Total weight gain approximately 25-35 lbs depending on starting weight   Should avoid alcohol, smoking, vaping, drugs, herbal supplements  Should check with provider regarding safety of any prescription or OTC meds  Avoid ibuprofen (motrin, advil) & naproxen (aleve)  Ok to take regular strength tylenol for headache/body aches  Constipation - increase fiber and water hydration, ok to take Colace or Metamucil  Avoid cleaning cat litter box  Wear gloves with gardening   Important to wear seat belt when riding in vehicle-lower belt should be below abdomen   Will need car seat to bring baby home from hospital    Discussed normal breast changes during early pregnancy-breasts may feel full/tender   Discussed breastfeeding benefits/recommendations for mom & baby - Plans to breastfeed  Baby: decreased risk of ear infections, allergies, resp infections, SIDS, obesity, diabetes, some cancers  Mom: decreased risk of PP bleeding, breast, ovarian & uterine cancer, type 2 diabetes, depression, osteoporosis; improved heart health  Rec: exlusive breastfeeding for the first 6 months, introduction of complementary foods at 6 months with continued breastfeeding for up to 2 yrs of age or longer as desired by mom/baby (AAP)     InBasket message to pt through pt portal with information regarding the [email or web address] [email or web address] for access to the Pregnancy A to Z guide/AWHONN guide and Prenatal Class schedule. Informed of ConnectedMOM program & encouraged to participate, Get Ready to Meet Your Baby pamphlet, Coffective jeremie and how to obtain a breast pump through insurance toward end of pregnancy

## 2025-03-10 ENCOUNTER — OFFICE VISIT (OUTPATIENT)
Dept: OBSTETRICS AND GYNECOLOGY | Facility: CLINIC | Age: 25
End: 2025-03-10

## 2025-03-10 ENCOUNTER — LAB VISIT (OUTPATIENT)
Dept: LAB | Facility: HOSPITAL | Age: 25
End: 2025-03-10
Attending: OBSTETRICS & GYNECOLOGY

## 2025-03-10 ENCOUNTER — RESULTS FOLLOW-UP (OUTPATIENT)
Dept: OBSTETRICS AND GYNECOLOGY | Facility: CLINIC | Age: 25
End: 2025-03-10

## 2025-03-10 VITALS
HEIGHT: 68 IN | HEART RATE: 89 BPM | DIASTOLIC BLOOD PRESSURE: 80 MMHG | WEIGHT: 145 LBS | BODY MASS INDEX: 21.98 KG/M2 | SYSTOLIC BLOOD PRESSURE: 116 MMHG

## 2025-03-10 DIAGNOSIS — N91.2 AMENORRHEA: Primary | ICD-10-CM

## 2025-03-10 DIAGNOSIS — R39.15 URINARY URGENCY: ICD-10-CM

## 2025-03-10 DIAGNOSIS — O20.9 VAGINAL BLEEDING AFFECTING EARLY PREGNANCY: ICD-10-CM

## 2025-03-10 DIAGNOSIS — Z11.3 SCREENING EXAMINATION FOR VENEREAL DISEASE: ICD-10-CM

## 2025-03-10 DIAGNOSIS — Z12.4 CERVICAL CANCER SCREENING: ICD-10-CM

## 2025-03-10 DIAGNOSIS — Z32.01 PREGNANCY EXAMINATION OR TEST, POSITIVE RESULT: ICD-10-CM

## 2025-03-10 LAB
ABO + RH BLD: NORMAL
BASOPHILS # BLD AUTO: 0.05 K/UL (ref 0–0.2)
BASOPHILS NFR BLD: 0.7 % (ref 0–1.9)
BLD GP AB SCN CELLS X3 SERPL QL: NORMAL
DIFFERENTIAL METHOD BLD: ABNORMAL
EOSINOPHIL # BLD AUTO: 0 K/UL (ref 0–0.5)
EOSINOPHIL NFR BLD: 0.3 % (ref 0–8)
ERYTHROCYTE [DISTWIDTH] IN BLOOD BY AUTOMATED COUNT: 15.5 % (ref 11.5–14.5)
HBV SURFACE AG SERPL QL IA: NORMAL
HCG INTACT+B SERPL-ACNC: NORMAL MIU/ML
HCT VFR BLD AUTO: 38 % (ref 37–48.5)
HCV AB SERPL QL IA: NORMAL
HGB BLD-MCNC: 12.6 G/DL (ref 12–16)
HIV 1+2 AB+HIV1 P24 AG SERPL QL IA: NORMAL
IMM GRANULOCYTES # BLD AUTO: 0.01 K/UL (ref 0–0.04)
IMM GRANULOCYTES NFR BLD AUTO: 0.1 % (ref 0–0.5)
LYMPHOCYTES # BLD AUTO: 1.8 K/UL (ref 1–4.8)
LYMPHOCYTES NFR BLD: 26.2 % (ref 18–48)
MCH RBC QN AUTO: 27.2 PG (ref 27–31)
MCHC RBC AUTO-ENTMCNC: 33.2 G/DL (ref 32–36)
MCV RBC AUTO: 82 FL (ref 82–98)
MONOCYTES # BLD AUTO: 0.6 K/UL (ref 0.3–1)
MONOCYTES NFR BLD: 8.1 % (ref 4–15)
NEUTROPHILS # BLD AUTO: 4.4 K/UL (ref 1.8–7.7)
NEUTROPHILS NFR BLD: 64.6 % (ref 38–73)
NRBC BLD-RTO: 0 /100 WBC
PLATELET # BLD AUTO: 282 K/UL (ref 150–450)
PMV BLD AUTO: 9.4 FL (ref 9.2–12.9)
RBC # BLD AUTO: 4.64 M/UL (ref 4–5.4)
TREPONEMA PALLIDUM IGG+IGM AB [PRESENCE] IN SERUM OR PLASMA BY IMMUNOASSAY: NONREACTIVE
WBC # BLD AUTO: 6.76 K/UL (ref 3.9–12.7)

## 2025-03-10 PROCEDURE — 85025 COMPLETE CBC W/AUTO DIFF WBC: CPT | Performed by: OBSTETRICS & GYNECOLOGY

## 2025-03-10 PROCEDURE — 87389 HIV-1 AG W/HIV-1&-2 AB AG IA: CPT | Performed by: OBSTETRICS & GYNECOLOGY

## 2025-03-10 PROCEDURE — 36415 COLL VENOUS BLD VENIPUNCTURE: CPT | Performed by: OBSTETRICS & GYNECOLOGY

## 2025-03-10 PROCEDURE — 86803 HEPATITIS C AB TEST: CPT | Performed by: OBSTETRICS & GYNECOLOGY

## 2025-03-10 PROCEDURE — 99999 PR PBB SHADOW E&M-EST. PATIENT-LVL III: CPT | Mod: PBBFAC,,, | Performed by: OBSTETRICS & GYNECOLOGY

## 2025-03-10 PROCEDURE — 88142 CYTOPATH C/V THIN LAYER: CPT | Performed by: OBSTETRICS & GYNECOLOGY

## 2025-03-10 PROCEDURE — 86901 BLOOD TYPING SEROLOGIC RH(D): CPT | Performed by: OBSTETRICS & GYNECOLOGY

## 2025-03-10 PROCEDURE — 84702 CHORIONIC GONADOTROPIN TEST: CPT | Performed by: OBSTETRICS & GYNECOLOGY

## 2025-03-10 PROCEDURE — 87591 N.GONORRHOEAE DNA AMP PROB: CPT | Performed by: OBSTETRICS & GYNECOLOGY

## 2025-03-10 PROCEDURE — 87086 URINE CULTURE/COLONY COUNT: CPT | Performed by: OBSTETRICS & GYNECOLOGY

## 2025-03-10 PROCEDURE — 86762 RUBELLA ANTIBODY: CPT | Performed by: OBSTETRICS & GYNECOLOGY

## 2025-03-10 PROCEDURE — 87340 HEPATITIS B SURFACE AG IA: CPT | Performed by: OBSTETRICS & GYNECOLOGY

## 2025-03-10 PROCEDURE — 86593 SYPHILIS TEST NON-TREP QUANT: CPT | Performed by: OBSTETRICS & GYNECOLOGY

## 2025-03-10 PROCEDURE — 99214 OFFICE O/P EST MOD 30 MIN: CPT | Mod: S$PBB,,, | Performed by: OBSTETRICS & GYNECOLOGY

## 2025-03-10 PROCEDURE — 99213 OFFICE O/P EST LOW 20 MIN: CPT | Mod: PBBFAC | Performed by: OBSTETRICS & GYNECOLOGY

## 2025-03-10 NOTE — PROGRESS NOTES
Subjective:   Patient ID: Manuel Davies is a 24 y.o. y.o. female.     Chief Complaint: Missed Menses       History of Present Illness:    Manuel Washington presents today complaining of amenorrhea. Patient's last menstrual period was 2024 (exact date).. Prior menstrual cycles have been regular. She reports breast tenderness, frequent urination, and positive home pregnancy test. UPT is positive.   She started having some bleeding yesterday.  Only light bleeding in the morning or with activity.  No cramping.       History reviewed. No pertinent past medical history.  History reviewed. No pertinent surgical history.  Social History     Socioeconomic History    Marital status: Significant Other     Spouse name: Bharat   Tobacco Use    Smoking status: Never    Smokeless tobacco: Never   Substance and Sexual Activity    Alcohol use: Not Currently     Comment: Not currently    Drug use: Never    Sexual activity: Yes     Partners: Male     Birth control/protection: None     Family History   Problem Relation Name Age of Onset    Breast cancer Neg Hx      Colon cancer Neg Hx      Ovarian cancer Neg Hx       OB History    Para Term  AB Living   3 0 0 0 2 0   SAB IAB Ectopic Multiple Live Births   2 0 0 0 0      # Outcome Date GA Lbr Js/2nd Weight Sex Type Anes PTL Lv   3 Current            2 SAB 10/07/24     SAB         Birth Comments: Cytotec   1 2024                 ROS:   Review of Systems   Constitutional:  Negative for chills, diaphoresis, fatigue, fever and unexpected weight change.   HENT:  Negative for congestion, hearing loss, postnasal drip, rhinorrhea, sinus pressure, sinus pain, sore throat and tinnitus.    Eyes:  Negative for pain, discharge and visual disturbance.   Respiratory:  Negative for apnea, cough, shortness of breath and wheezing.    Cardiovascular:  Negative for chest pain, palpitations and leg swelling.   Gastrointestinal:  Negative for abdominal pain,  constipation, diarrhea, nausea and vomiting.   Endocrine: Negative for cold intolerance, heat intolerance, polydipsia, polyphagia and polyuria.   Genitourinary:  Positive for vaginal bleeding. Negative for difficulty urinating, dyspareunia, dysuria, enuresis, flank pain, frequency, genital sores, hematuria, menstrual problem, pelvic pain, urgency, vaginal discharge and vaginal pain.   Musculoskeletal:  Negative for arthralgias, back pain, joint swelling, myalgias, neck pain and neck stiffness.   Skin:  Negative for color change, pallor and rash.   Allergic/Immunologic: Negative for environmental allergies, food allergies and immunocompromised state.   Neurological:  Negative for dizziness, weakness, light-headedness, numbness and headaches.   Hematological:  Negative for adenopathy. Does not bruise/bleed easily.   Psychiatric/Behavioral:  Negative for agitation and confusion. The patient is not nervous/anxious.            Objective:   Vital Signs:  Vitals:    03/10/25 0807   BP: 116/80   Pulse: 89       Physical Exam:  General:  alert,normal appearing gravid female   Head: Normocephalic, atraumatic   Neck: Supple, Normal ROM   Respiratory: Normal effort   Neuro/Psych: Alert and oriented, appropriate mood and affect   Abdomen:   Soft, NT   Pelvis: External genitalia: normal general appearance  Urinary system: urethral meatus normal, bladder nontender  Vaginal: normal mucosa without prolapse or lesions, presence of blood, light  Cervix: normal appearance  Uterus: normal size, shape, position, vscan shows intrauterine GS  Adnexa: normal size, nontender bilaterally       Assessment:      1. Amenorrhea    2. Urinary urgency    3. Screening examination for venereal disease    4. Cervical cancer screening    5. Pregnancy examination or test, positive result    6. Vaginal bleeding affecting early pregnancy          Plan:        Amenorrhea  -     POCT urine pregnancy    Urinary urgency  -     Urine Culture High  Risk    Screening examination for venereal disease  -     C. trachomatis/N. gonorrhoeae by AMP DNA [facility]; Cervix    Cervical cancer screening  -     Liquid-Based Pap Smear, Screening    Pregnancy examination or test, positive result  -     Type & Screen - Ob Profile; Future; Expected date: 03/10/2025  -     CBC Auto Differential; Future; Expected date: 03/10/2025  -     HCG, Quantitative; Standing  -     US OB/GYN Procedure (Viewpoint) - Extended List; Future; Expected date: 03/24/2025  -     Hepatitis B Surface Antigen; Future; Expected date: 03/10/2025  -     Rubella Antibody, IgG; Future; Expected date: 03/10/2025  -     Treponema Pallidium Antibodies IgG, IgM; Future; Expected date: 03/10/2025  -     Hepatitis C Antibody; Future; Expected date: 03/10/2025  -     HIV 1/2 Ag/Ab (4th Gen); Future; Expected date: 03/10/2025    Vaginal bleeding affecting early pregnancy  -     Type & Screen - Ob Profile; Future; Expected date: 03/10/2025  -     CBC Auto Differential; Future; Expected date: 03/10/2025  -     HCG, Quantitative; Standing  -     US OB/GYN Procedure ([facility]point) - Extended List; Future; Expected date: 03/24/2025        1. Pap today  2. Prenatal labs ordered and GC/CT performed.  Repeat hcg in 2 days.   3. Schedule Ultrasound (next week)  4. Follow up in 4 weeks after ultrasound.    Patient was counseled today on proper weight gain based on the [place] [employer] [facility]'s recommendations based on her pre-pregnancy weight. Discussed foods to avoid in pregnancy (i.e. sushi, fish that are high in mercury, deli meat, and unpasteurized cheeses). Discussed prenatal vitamin options (i.e. stool softener, DHA). She was also counseled on safe, healthy behavior as well as medications safe in pregnancy.

## 2025-03-11 LAB
BACTERIA UR CULT: NORMAL
C TRACH DNA SPEC QL NAA+PROBE: DETECTED
FINAL PATHOLOGIC DIAGNOSIS: NORMAL
Lab: NORMAL
N GONORRHOEA DNA SPEC QL NAA+PROBE: NOT DETECTED
RUBV IGG SER-ACNC: 6.4 IU/ML
RUBV IGG SER-IMP: ABNORMAL

## 2025-03-11 RX ORDER — AZITHROMYCIN 500 MG/1
1000 TABLET, FILM COATED ORAL ONCE
Qty: 2 TABLET | Refills: 0 | Status: SHIPPED | OUTPATIENT
Start: 2025-03-11 | End: 2025-03-11

## 2025-03-12 ENCOUNTER — TELEPHONE (OUTPATIENT)
Dept: OBSTETRICS AND GYNECOLOGY | Facility: CLINIC | Age: 25
End: 2025-03-12

## 2025-03-12 ENCOUNTER — LAB VISIT (OUTPATIENT)
Dept: LAB | Facility: HOSPITAL | Age: 25
End: 2025-03-12
Attending: OBSTETRICS & GYNECOLOGY

## 2025-03-12 DIAGNOSIS — O20.9 VAGINAL BLEEDING AFFECTING EARLY PREGNANCY: ICD-10-CM

## 2025-03-12 DIAGNOSIS — Z32.01 PREGNANCY EXAMINATION OR TEST, POSITIVE RESULT: ICD-10-CM

## 2025-03-12 LAB — HCG INTACT+B SERPL-ACNC: NORMAL MIU/ML

## 2025-03-12 PROCEDURE — 36415 COLL VENOUS BLD VENIPUNCTURE: CPT | Performed by: OBSTETRICS & GYNECOLOGY

## 2025-03-12 PROCEDURE — 84702 CHORIONIC GONADOTROPIN TEST: CPT | Performed by: OBSTETRICS & GYNECOLOGY

## 2025-03-12 NOTE — TELEPHONE ENCOUNTER
----- Message from Med Assistant Gamble sent at 3/12/2025 12:45 PM CDT -----  Contact: self  Manuel DaviesN: [id number] Phone      Not on file.Work Phone      Not on file.Mobile          [phone] Care Team:No, Primary Doctor as PCP - Linn Fishman MD as Consulting Physician (Obstetrics and Gynecology)OB? NoWt phone number can you be reached at? [phone] (Bharat) Message: Needs to speak to nurse regarding getting something called in for partner.

## 2025-03-12 NOTE — TELEPHONE ENCOUNTER
Contacted pt using language line  Georgie ID# [id number]. Pt informed to contact clinic with partner for treatment, but partner is at work. Per pt he will be available from 12- tomorrow. Pt instructed to call back with partner so I can get his name//verify allergies so rx can be sent in. Pt voiced understanding.

## 2025-03-17 NOTE — TELEPHONE ENCOUNTER
Attempted to contact pt using language line  Corina ID# [id number]. No answer. Unable to leave voicemail due to voicemail full.

## 2025-03-18 ENCOUNTER — LAB VISIT (OUTPATIENT)
Dept: LAB | Facility: HOSPITAL | Age: 25
End: 2025-03-18
Attending: OBSTETRICS & GYNECOLOGY

## 2025-03-18 ENCOUNTER — PROCEDURE VISIT (OUTPATIENT)
Dept: MATERNAL FETAL MEDICINE | Facility: CLINIC | Age: 25
End: 2025-03-18

## 2025-03-18 ENCOUNTER — INITIAL PRENATAL (OUTPATIENT)
Dept: OBSTETRICS AND GYNECOLOGY | Facility: CLINIC | Age: 25
End: 2025-03-18

## 2025-03-18 VITALS
DIASTOLIC BLOOD PRESSURE: 62 MMHG | HEART RATE: 86 BPM | WEIGHT: 146 LBS | BODY MASS INDEX: 22.2 KG/M2 | SYSTOLIC BLOOD PRESSURE: 94 MMHG

## 2025-03-18 DIAGNOSIS — O03.9 SAB (SPONTANEOUS ABORTION): Primary | ICD-10-CM

## 2025-03-18 DIAGNOSIS — N96 HISTORY OF RECURRENT MISCARRIAGES: ICD-10-CM

## 2025-03-18 DIAGNOSIS — N91.2 ABSENT MENSES: ICD-10-CM

## 2025-03-18 LAB — TSH SERPL DL<=0.005 MIU/L-ACNC: 1.15 UIU/ML (ref 0.4–4)

## 2025-03-18 PROCEDURE — 36415 COLL VENOUS BLD VENIPUNCTURE: CPT | Performed by: OBSTETRICS & GYNECOLOGY

## 2025-03-18 PROCEDURE — 99213 OFFICE O/P EST LOW 20 MIN: CPT | Mod: PBBFAC | Performed by: OBSTETRICS & GYNECOLOGY

## 2025-03-18 PROCEDURE — 76817 TRANSVAGINAL US OBSTETRIC: CPT | Mod: PBBFAC | Performed by: STUDENT IN AN ORGANIZED HEALTH CARE EDUCATION/TRAINING PROGRAM

## 2025-03-18 PROCEDURE — 83036 HEMOGLOBIN GLYCOSYLATED A1C: CPT | Performed by: OBSTETRICS & GYNECOLOGY

## 2025-03-18 PROCEDURE — 85670 THROMBIN TIME PLASMA: CPT | Performed by: OBSTETRICS & GYNECOLOGY

## 2025-03-18 PROCEDURE — 84443 ASSAY THYROID STIM HORMONE: CPT | Performed by: OBSTETRICS & GYNECOLOGY

## 2025-03-18 PROCEDURE — 99213 OFFICE O/P EST LOW 20 MIN: CPT | Mod: S$PBB,,, | Performed by: OBSTETRICS & GYNECOLOGY

## 2025-03-18 PROCEDURE — 85520 HEPARIN ASSAY: CPT | Performed by: OBSTETRICS & GYNECOLOGY

## 2025-03-18 PROCEDURE — 85610 PROTHROMBIN TIME: CPT | Performed by: OBSTETRICS & GYNECOLOGY

## 2025-03-18 PROCEDURE — 86147 CARDIOLIPIN ANTIBODY EA IG: CPT | Mod: 59 | Performed by: OBSTETRICS & GYNECOLOGY

## 2025-03-18 PROCEDURE — 86146 BETA-2 GLYCOPROTEIN ANTIBODY: CPT | Performed by: OBSTETRICS & GYNECOLOGY

## 2025-03-18 PROCEDURE — 99999 PR PBB SHADOW E&M-EST. PATIENT-LVL III: CPT | Mod: PBBFAC,,, | Performed by: OBSTETRICS & GYNECOLOGY

## 2025-03-18 PROCEDURE — 85598 HEXAGNAL PHOSPH PLTLT NEUTRL: CPT | Performed by: OBSTETRICS & GYNECOLOGY

## 2025-03-19 LAB
ESTIMATED AVG GLUCOSE: 103 MG/DL (ref 68–131)
HBA1C MFR BLD: 5.2 % (ref 4–5.6)

## 2025-03-19 NOTE — PROGRESS NOTES
Subjective:       Patient ID: Manuel Davies is a 24 y.o. female.    Chief Complaint:  Follow-up (sab) and Vaginal Bleeding      History of Present Illness  Follow-up  Pertinent negatives include no abdominal pain, arthralgias, chest pain, chills, congestion, coughing, diaphoresis, fatigue, fever, headaches, joint swelling, myalgias, nausea, neck pain, numbness, rash, sore throat, vomiting or weakness.   Vaginal Bleeding  The patient's pertinent negatives include no pelvic pain or vaginal discharge. Pertinent negatives include no abdominal pain, back pain, chills, constipation, diarrhea, dysuria, fever, flank pain, frequency, headaches, hematuria, nausea, rash, sore throat, urgency or vomiting.     Pt here for follow up of miscarriage.  She reports bleeding started last week and progressed to very heavy.  She passed a very large clot with tissue on Friday.  Picture taken and reviewed - products of conception noted (size of the palm). She reports that bleeding and cramping has since lightened.      Ultrasound today consistent with complete ab based on HPI.     GYN & OB History  Patient's last menstrual period was 2024 (exact date).   Date of Last Pap: 3/11/2025    OB History    Para Term  AB Living   3 0 0 0 2 0   SAB IAB Ectopic Multiple Live Births   2 0 0 0 0      # Outcome Date GA Lbr Js/2nd Weight Sex Type Anes PTL Lv   3 Current            2 SAB 10/07/24     SAB         Birth Comments: Cytotec   1 SAB 2024               Review of Systems  Review of Systems   Constitutional:  Negative for chills, diaphoresis, fatigue, fever and unexpected weight change.   HENT:  Negative for congestion, hearing loss, postnasal drip, rhinorrhea, sinus pressure, sinus pain, sore throat and tinnitus.    Eyes:  Negative for pain, discharge and visual disturbance.   Respiratory:  Negative for apnea, cough, shortness of breath and wheezing.    Cardiovascular:  Negative for chest pain,  "palpitations and leg swelling.   Gastrointestinal:  Negative for abdominal pain, constipation, diarrhea, nausea and vomiting.   Endocrine: Negative for cold intolerance, heat intolerance, polydipsia, polyphagia and polyuria.   Genitourinary:  Positive for vaginal bleeding. Negative for difficulty urinating, dyspareunia, dysuria, enuresis, flank pain, frequency, genital sores, hematuria, menstrual problem, pelvic pain, urgency, vaginal discharge and vaginal pain.   Musculoskeletal:  Negative for arthralgias, back pain, joint swelling, myalgias, neck pain and neck stiffness.   Skin:  Negative for color change, pallor and rash.   Allergic/Immunologic: Negative for environmental allergies, food allergies and immunocompromised state.   Neurological:  Negative for dizziness, weakness, light-headedness, numbness and headaches.   Hematological:  Negative for adenopathy. Does not bruise/bleed easily.   Psychiatric/Behavioral:  Negative for agitation and confusion. The patient is not nervous/anxious.            Objective:    Physical Exam:   Constitutional: She is oriented to person, place, and time. She appears well-developed and well-nourished. No distress.    HENT:   Head: Normocephalic and atraumatic.    Eyes: Conjunctivae and EOM are normal.      Pulmonary/Chest: Effort normal. No respiratory distress.                  Musculoskeletal: Normal range of motion.       Neurological: She is alert and oriented to person, place, and time.    Skin: Skin is warm and dry.    Psychiatric: She has a normal mood and affect. Her behavior is normal. Judgment and thought content normal.          Assessment:        1. SAB (spontaneous )    2. History of recurrent miscarriages               Plan:      Manuel Padmini "Manuel" was seen today for follow-up and vaginal bleeding.    Diagnoses and all orders for this visit:    SAB (spontaneous )    History of recurrent miscarriages      Discussed diagnosis of complete AB. "   This is patient's 3rd miscarriage in 12 months.  Discussed RPL blood work. Will have done today.     This encounter was done using an audiovisual . The patient was given the opportunity to ask questions and everything was answered to her satisfaction. She voiced understanding of diagnosis and plan.  Wili,  ID : [id number]        I spent a total of 30 minutes on the day of the visit.This includes face to face time and non-face to face time preparing to see the patient (eg, review of tests), Obtaining and/or reviewing separately obtained history, Documenting clinical information in the electronic or other health record, Independently interpreting resultsand communicating results to the patient/family/caregiver, or Care coordination.

## 2025-03-21 LAB
B2 GLYCOPROT1 IGA SER QL: 1.7 U/ML
B2 GLYCOPROT1 IGG SER QL: 1.2 U/ML
B2 GLYCOPROT1 IGM SER QL: <2.4 U/ML
CARDIOLIPIN IGG SER IA-ACNC: <9.4 GPL (ref 0–14.99)
CARDIOLIPIN IGM SER IA-ACNC: <9.4 MPL (ref 0–12.49)
CONFIRM DRVVT STA-STACLOT: 5.9 S
DRVVT SCREEN TO CONFIRM RATIO: ABNORMAL {RATIO}
HEPARIN NT PPP QL: ABNORMAL
LA 3 SCREEN W REFLEX-IMP: ABNORMAL
LMW HEPARIN IND PLT AB SER QL: ABNORMAL
MIXING DRVVT/NORMAL: ABNORMAL %
NEUTRALIZED DRVVT SCREEN RATIO: ABNORMAL
PROTHROMBIN TIME: 14.1 S (ref 12–15.5)
SCREEN APTT/NORMAL: 1.27
SCREEN APTT/NORMAL: ABNORMAL
SCREEN DRVVT/NORMAL: 0.87 %
THROMBIN TIME: 16.9 S

## 2025-03-28 ENCOUNTER — RESULTS FOLLOW-UP (OUTPATIENT)
Dept: OBSTETRICS AND GYNECOLOGY | Facility: HOSPITAL | Age: 25
End: 2025-03-28

## 2025-03-28 DIAGNOSIS — N84.0 ENDOMETRIUM, POLYP: Primary | ICD-10-CM

## 2025-03-28 DIAGNOSIS — N96 HISTORY OF RECURRENT MISCARRIAGES: ICD-10-CM

## 2025-04-02 ENCOUNTER — TELEPHONE (OUTPATIENT)
Dept: OBSTETRICS AND GYNECOLOGY | Facility: CLINIC | Age: 25
End: 2025-04-02

## 2025-04-02 NOTE — TELEPHONE ENCOUNTER
----- Message from Linn Chaidez MD sent at 4/2/2025  9:08 AM CDT -----  Please call patient to schedule hysteroscopy D&C with polypectomy.  (Citizen of [place] speaking)  ----- Message -----  From: May Mon LPN  Sent: 3/31/2025   3:52 PM CDT  To: Linn Chaidez MD    Pt was called using language line  Bharat ID# [id number]. Pt was informed of normal blood work results from recent blood draw for recurrent miscarriages. Pt voiced understanding.    Pt desires to have the suspected endometrial polyp removed that was found on ultrasound in October. She is ready to schedule and said she would come in to discuss this is appt is needed. Please   advise. Thank you.  ----- Message -----  From: Linn Chaidez MD  Sent: 3/28/2025   8:55 AM CDT  To: [facility] Clinical Staff    Please inform patient of normal lab results.   ----- Message -----  From: Stanislav, [facility] Lab Interface  Sent: 3/18/2025   4:44 PM CDT  To: Linn Chaidez MD

## 2025-04-02 NOTE — TELEPHONE ENCOUNTER
[facility]  Andre ID# [id number].   Hysteroscopic D&C with polypectomy scheduled for 4/17/25 with pre-op, pre-admit and post op appointments scheduled and discussed with patient.  Instructed to arrive through main entrance of [facility] at 7a day of surgery.  Pre-admit nurse will call day before if there is a time change. Will need someone to drive pt to and from hospital.  Pt denies cardiac history, stroke, stents or valve replacements. Medication list reviewed with patient.  Case request number [id number].  Selin in surgery department notified of date and time. Pt requesting price for surgery.  Emailed Central pricing, awaiting response.   Instructed on the following:    WEEK BEFORE SURGERY:  STOP ALL NSAIDS (ibuprofen, Aleve, Aspirin, etc) 7 days prior to your surgery.  NOTIFY your doctor if you are on any of the following medications listed below:  Dulaglutide (Trulicity)  Liraglutide (Victoza)  Semaglutide (Ozempic, Wegovy, Rybelsus)  Exenatide (Byetta, Bydureon)  Lixisenatide (Adlyxin)  Tirzepatide (Mounjaro)  Phentermine (Adipex)  These medications should be stopped 7 days prior to scheduled surgery. Patient verbalized understanding.

## 2025-04-14 ENCOUNTER — HOSPITAL ENCOUNTER (OUTPATIENT)
Dept: PREADMISSION TESTING | Facility: HOSPITAL | Age: 25
Discharge: HOME | End: 2025-04-14
Attending: OBSTETRICS & GYNECOLOGY

## 2025-04-14 ENCOUNTER — OFFICE VISIT (OUTPATIENT)
Dept: OBSTETRICS AND GYNECOLOGY | Facility: CLINIC | Age: 25
End: 2025-04-14

## 2025-04-14 VITALS
SYSTOLIC BLOOD PRESSURE: 100 MMHG | BODY MASS INDEX: 22.1 KG/M2 | HEART RATE: 63 BPM | HEIGHT: 68 IN | DIASTOLIC BLOOD PRESSURE: 64 MMHG | WEIGHT: 145.81 LBS

## 2025-04-14 DIAGNOSIS — N96 HISTORY OF RECURRENT MISCARRIAGES: ICD-10-CM

## 2025-04-14 DIAGNOSIS — N84.0 ENDOMETRIAL POLYP: Primary | ICD-10-CM

## 2025-04-14 PROCEDURE — 99999 PR PBB SHADOW E&M-EST. PATIENT-LVL III: CPT | Mod: PBBFAC,,, | Performed by: OBSTETRICS & GYNECOLOGY

## 2025-04-14 PROCEDURE — 99499 UNLISTED E&M SERVICE: CPT | Mod: S$PBB,,, | Performed by: OBSTETRICS & GYNECOLOGY

## 2025-04-14 PROCEDURE — 99213 OFFICE O/P EST LOW 20 MIN: CPT | Mod: PBBFAC | Performed by: OBSTETRICS & GYNECOLOGY

## 2025-04-14 RX ORDER — FAMOTIDINE 20 MG/1
20 TABLET, FILM COATED ORAL
OUTPATIENT
Start: 2025-04-14

## 2025-04-14 RX ORDER — SODIUM CHLORIDE 9 MG/ML
INJECTION, SOLUTION INTRAVENOUS CONTINUOUS
OUTPATIENT
Start: 2025-04-14

## 2025-04-14 NOTE — H&P (VIEW-ONLY)
Pre-Operative History and Physical   Obstetrics and Gynecology    Manuel Davies is a 24 y.o. female  for preop examination.  She is scheduled for a hysteroscopic polypectomy on 25. Patient has an endometrial polyp and has history of recurrent miscarriages.     ROS:  GENERAL: Denies weight gain or weight loss. Feeling well overall.   SKIN: Denies rash or lesions.   HEAD: Denies head injury or headache.   NODES: Denies enlarged lymph nodes.   CHEST: Denies chest pain or shortness of breath.   CARDIOVASCULAR: Denies palpitations or left sided chest pain.   ABDOMEN: No abdominal pain, constipation, diarrhea, nausea, vomiting or rectal bleeding.   URINARY: No frequency, dysuria, hematuria, or burning on urination.  REPRODUCTIVE: See HPI.   BREASTS: The patient performs breast self-examination and denies pain, lumps, or nipple discharge.   HEMATOLOGIC: No easy bruisability or excessive bleeding with the exception of menstrual cycles.  MUSCULOSKELETAL: Denies joint pain or swelling.   NEUROLOGIC: Denies syncope or weakness.   PSYCHIATRIC: Denies depression, anxiety or mood swings.    History reviewed. No pertinent past medical history.  History reviewed. No pertinent surgical history.  Family History   Problem Relation Name Age of Onset    Breast cancer Neg Hx      Colon cancer Neg Hx      Ovarian cancer Neg Hx       Review of patient's allergies indicates:  No Known Allergies  Current Medications[1]  Outpatient Medications Marked as Taking for the 25 encounter (Office Visit) with Linn Chaidez MD   Medication Sig Dispense Refill    PNV Comb 13/Iron Cb/FA/DSS/DHA (PRENATAL 13-IRON-FA-DSS-DHA ORAL) Take by mouth.       Social History[2]      Last pap 3/25 NEM    Last ultrasound   Uterine size appears normal measuring 9.3 x 5.4 x 5.8 cm with measured volume 153.8 cc maintaining uniform echodensity pattern without evidence of contour deforming mass or absence of the myometrial  architecture.     Endometrial stripe thickness prominent approaching 2.9 mm in total along the apex margin of the endometrial cavity with widened margin and and some internal complexity with some prominent vascularity noted about the peripheral edge that is equivocal.  The degree of endometrial stripe thickness beyond the realm of normal.  Possibility of an endometrial polyp is raised.  No evidence of intra gestation.  No significant fluid within the endometrial cavity.     Left ovary measures 3.0 x 1.9 x 2.2 cm.  The right ovary measures 2.7 x 1.5 x 2.9 cm.     No evidence of gestational sac medial sec, or subchronic hemorrhage.     Impression:     Endometrial stripe thickness approaching 2.9 mm with with internal complexity and some increased vascularity may reflected endometrial polyp, although further evaluation based the patient's clinical history is strongly advised.  Further evaluation with hysterosonography may prove useful.    Vitals:    04/14/25 1215   BP: 100/64   Pulse: 63     Physical Exam  Vitals reviewed.   Constitutional:       General: She is not in acute distress.     Appearance: She is well-developed.   HENT:      Head: Normocephalic and atraumatic.   Eyes:      Conjunctiva/sclera: Conjunctivae normal.   Pulmonary:      Effort: Pulmonary effort is normal.   Musculoskeletal:      Cervical back: Normal range of motion and neck supple.   Neurological:      Mental Status: She is alert and oriented to person, place, and time.   Psychiatric:         Behavior: Behavior normal.         Thought Content: Thought content normal.         Judgment: Judgment normal.           Assessment: Endometrial polyp, recurrent miscarriage    Plan: hysteroscopic polypectomy    I have discussed the risks, benefits, indications, and alternatives of the procedure in detail.  The patient verbalizes her understanding.  All questions answered.  Consents signed.  The patient agrees to proceed to proceed as planned.                  [1]   Current Outpatient Medications:     PNV Comb 13/Iron Cb/FA/DSS/DHA (PRENATAL 13-IRON-FA-DSS-DHA ORAL), Take by mouth., Disp: , Rfl:   [2]   Social History  Tobacco Use    Smoking status: Never    Smokeless tobacco: Never   Substance Use Topics    Alcohol use: Not Currently     Comment: Not currently    Drug use: Never

## 2025-04-14 NOTE — DISCHARGE INSTRUCTIONS
[facility]  Pre Admit Instructions    Day and Date of Procedure:      Call your doctor if you become ill, have an infection, or are taking antibiotics before your surgery  Someone will call you between 1 p.m. And 5 p.m. the workday before the procedure to give you an arrival time       - If your procedure is before 7 a.m. enter through Emergency Room door and check in with them       - 7 a.m. to 5 p.m. Enter through main entrance and check in with registration  You must have a responsible  to bring you home  Only one person will be allowed per patient due to Covid-19 restrictions  You must wear a mask at all times. If you do not have a mask, we will provide you with one. No Exception.   Cafeteria hours for guest: 7am to 10am; 11am to 1:30 pm.    Do NOT eat anything past:   Clear liquids until:  No dairy, creamers, gum or mints the morning of your procedure    Please    Do not wear makeup, jewelry, nail polish or body piercings  Bring containers/solution for contacts, dentures, bridges - these and hearing aids will be removed before your procedure  Do not bring cash, jewelry or valuables the day of your procedure   No smoking at least 24 hours before your procedure  Wear clothing that is comfortable and easy to take off and put on  Do NOT shave for at least 5 days before your surgery    PRE-OP Hibiclens bath Instructions:    Shower with Hibiclens the Night before and morning of the procedure.   Wash your face with your regular cleanser. DO NOT use Hibiclens on your face.  Thoroughly rinse your body with warm water from the neck down  Apply Hibiclens directly to your skin or on a wet washcloth and wash gently. Do not stand under the water while washing with Hibiclens as you will   remove the wash too soon.  Rinse thoroughly with warm water  DO NOT use your regular soap after you have bathed with the Hibiclens  Do not apply lotion or deodorant   Put on a clean pair of clothes after each  bath          Information about your stay (Please Review)    Before Surgery  Your doctor may order and review labs, x-rays, ECG or other tests as a pre-surgery workup and will call you if there is need for follow up.  If you did not get a Covid test before your procedure, one will be done when you arrive. You must have a negative Covid test result before your procedure.  No smoking inside or outside the hospital. You must leave the campus to smoke.  Wear clothing that is easy to take off and put on.  The hospital will provide you with a gown.  You may bring robe, slippers, nightwear, and toiletries (toothbrush, toothpaste, makeup) if needed.  If your doctor orders a Fleets Enema or other prep, follow package and/or doctors orders.  Brush your teeth and rinse your mouth the morning of surgery, but dont swallow the water.  The nurse will ask questions and check your condition.  The doctor may efren your surgical site.  Compression boots will be placed on your calves to reduce the risk of blood clots.  An IV will be started in pre-procedure area.  The doctor may order medicine to help you relax before surgery.  After Surgery  After you recover in post-procedure area you will go to the medicine floor to recover for a few more hours.  The nurse will check your temperature, breathing, blood pressure, heart rate, IV site, and surgery site.  A diet will be ordered-most start with ice chips and then advance slowly to other foods.  If you have IV fluids the IV pump will beep to let the nurse know that she needs to check it.  You may have a urinary catheter and staff may measure your oral intake and urine output.  Pain medication may be ordered by the doctor after surgery.  If you have a pain management device tell your caretakers not to press the button because of OVERDOSE RISK.  When the nurse or doctor tells you it is okay to get out of bed, ask for help until you are stable.  The nurse may ask you to turn, cough, and deep  breathe to prevent lung problems.  You can use a pillow to hold your incision when you deep breathe or cough to reduce pain.  The nurse will give you discharge instructions--incision care, symptoms to report to your doctor, and your follow-up appointment when you are discharged. You cannot drive yourself home.  Only one person is allowed during your stay due to Covid-19 restrictions. Visiting hours are 8 am to 6 pm.    Goals for Discharge from One Day Surgery  Control pain with an oral medication  Walk without feeling dizzy or weak  Tolerate liquids well  Urinate without difficulty    Things you can do to  Reduce the Risk of Infections or Complications  Wash Hands and use Waterless Hand Sanitizers  Wash hands frequently with soap and warm water for at least 15 seconds.   Use hand sanitizers (alcohol based) often at home and in public if hands are not visibly soiled  Take Antibiotic Exactly as Prescribed  Do not stop antibiotics too soon; you risk developing infection resistant to antibiotics  Take your antibiotic even if you are feeling better and even if they upset your stomach  Call the doctor if you cant tolerate the antibiotic or you have an allergic reaction  Stay Healthy  Take medicines as prescribed by your doctor  Keep your diabetes under control - diet and medication  Get enough rest, exercise and eat a healthy diet  Keep the Wound Clean and Dry  Wash hands before and after taking care of the incision (cut)  Wash hands when you remove a dressing, before you touch/apply a new dressing  Shower and clean incision with antibacterial soap and rinse well if the doctor approves  Allow the cut to dry completely before putting on a clean dressing  Do not touch the part of the bandage that will cover the incision  Do not use ointments unless your doctor tells you to-can promote bacterial growth  If ordered, put ointment directly on the dressing-do not touch the end of the tube  Do not scrub, remove scabs, or leave a  damp dressing on the incision  Do not use peroxide or alcohol to clean the incision unless the doctor tells you to   Do not let children, pets or anyone else contaminate the incision  Stop Smoking To Prevent Infection  Stop smoking-Centers for Disease Control recommends 30 days before surgery  Smokers get more infections after surgery-studies have shown 6 times the risk  Smokers have more scarring and heal slower-open wounds get infected easier  Prevent Respiratory complications  Stop smoking  Turn, cough, and deep breathe even if you have some pain when you do so.  Splint your incision with a pillow when you cough/deep breath, to help control pain.  Do not lie in one position for long periods of time.   Prevent Blood Clots  When you wake move your legs, flex your feet, rotate your ankles, wiggle your toes  Get up when the doctor says its ok.  Dangle your feet from the side of the bed  Report symptoms-leg pain, redness/swelling, warm to touch; fever; shortness of breath, chest pain, severe upper back pain.

## 2025-04-15 ENCOUNTER — TELEPHONE (OUTPATIENT)
Dept: OBSTETRICS AND GYNECOLOGY | Facility: CLINIC | Age: 25
End: 2025-04-15

## 2025-04-15 NOTE — TELEPHONE ENCOUNTER
----- Message from Nidia sent at 4/15/2025  1:58 PM CDT -----  Contact: Self  Manuel DaviesMRN: [id number] Phone      Not on file.Work Phone      Not on file.Mobile          [phone] Care Team:No, Primary Doctor as PCP - Linn Fishman MD as Consulting Physician (Obstetrics and Gynecology)OB? NoWhat phone number can you be reached at? [phone]: has questions about upcoming surgery. Pt will need an .  Name: Iram ID# [id number]

## 2025-04-15 NOTE — TELEPHONE ENCOUNTER
Contacted pt using language line  Sameera ID# [id number]. Pt stated she started her cycle today and was wondering if that is ok with her surgery. Pt informed that it is ok and she voiced understanding.

## 2025-04-17 ENCOUNTER — ANESTHESIA (OUTPATIENT)
Dept: SURGERY | Facility: HOSPITAL | Age: 25
End: 2025-04-17

## 2025-04-17 ENCOUNTER — ANESTHESIA EVENT (OUTPATIENT)
Dept: SURGERY | Facility: HOSPITAL | Age: 25
End: 2025-04-17

## 2025-04-17 ENCOUNTER — HOSPITAL ENCOUNTER (OUTPATIENT)
Facility: HOSPITAL | Age: 25
Discharge: HOME | End: 2025-04-17
Attending: OBSTETRICS & GYNECOLOGY | Admitting: OBSTETRICS & GYNECOLOGY

## 2025-04-17 VITALS
DIASTOLIC BLOOD PRESSURE: 63 MMHG | OXYGEN SATURATION: 100 % | SYSTOLIC BLOOD PRESSURE: 106 MMHG | RESPIRATION RATE: 16 BRPM | HEART RATE: 68 BPM | TEMPERATURE: 97 F

## 2025-04-17 DIAGNOSIS — N96 HISTORY OF RECURRENT MISCARRIAGES: ICD-10-CM

## 2025-04-17 DIAGNOSIS — A74.9 CHLAMYDIA INFECTION AFFECTING PREGNANCY, ANTEPARTUM: Primary | ICD-10-CM

## 2025-04-17 DIAGNOSIS — N84.0 ENDOMETRIUM, POLYP: ICD-10-CM

## 2025-04-17 DIAGNOSIS — O98.819 CHLAMYDIA INFECTION AFFECTING PREGNANCY, ANTEPARTUM: Primary | ICD-10-CM

## 2025-04-17 PROCEDURE — 63600175 PHARM REV CODE 636 W HCPCS: Performed by: NURSE ANESTHETIST, CERTIFIED REGISTERED

## 2025-04-17 PROCEDURE — 88305 TISSUE EXAM BY PATHOLOGIST: CPT | Mod: TC | Performed by: OBSTETRICS & GYNECOLOGY

## 2025-04-17 PROCEDURE — 27201423 OPTIME MED/SURG SUP & DEVICES STERILE SUPPLY: Performed by: OBSTETRICS & GYNECOLOGY

## 2025-04-17 PROCEDURE — 71000033 HC RECOVERY, INTIAL HOUR: Performed by: OBSTETRICS & GYNECOLOGY

## 2025-04-17 PROCEDURE — 37000009 HC ANESTHESIA EA ADD 15 MINS: Performed by: OBSTETRICS & GYNECOLOGY

## 2025-04-17 PROCEDURE — 58558 HYSTEROSCOPY BIOPSY: CPT | Mod: ,,, | Performed by: OBSTETRICS & GYNECOLOGY

## 2025-04-17 PROCEDURE — 36000706: Performed by: OBSTETRICS & GYNECOLOGY

## 2025-04-17 PROCEDURE — C1782 MORCELLATOR: HCPCS | Performed by: OBSTETRICS & GYNECOLOGY

## 2025-04-17 PROCEDURE — 37000008 HC ANESTHESIA 1ST 15 MINUTES: Performed by: OBSTETRICS & GYNECOLOGY

## 2025-04-17 PROCEDURE — 71000039 HC RECOVERY, EACH ADD'L HOUR: Performed by: OBSTETRICS & GYNECOLOGY

## 2025-04-17 PROCEDURE — 36000707: Performed by: OBSTETRICS & GYNECOLOGY

## 2025-04-17 RX ORDER — DEXAMETHASONE SODIUM PHOSPHATE 4 MG/ML
INJECTION, SOLUTION INTRA-ARTICULAR; INTRALESIONAL; INTRAMUSCULAR; INTRAVENOUS; SOFT TISSUE
Status: DISCONTINUED | OUTPATIENT
Start: 2025-04-17 | End: 2025-04-17

## 2025-04-17 RX ORDER — SODIUM CHLORIDE, SODIUM LACTATE, POTASSIUM CHLORIDE, CALCIUM CHLORIDE 600; 310; 30; 20 MG/100ML; MG/100ML; MG/100ML; MG/100ML
INJECTION, SOLUTION INTRAVENOUS CONTINUOUS PRN
Status: DISCONTINUED | OUTPATIENT
Start: 2025-04-17 | End: 2025-04-17

## 2025-04-17 RX ORDER — KETOROLAC TROMETHAMINE 30 MG/ML
INJECTION, SOLUTION INTRAMUSCULAR; INTRAVENOUS
Status: DISCONTINUED | OUTPATIENT
Start: 2025-04-17 | End: 2025-04-17

## 2025-04-17 RX ORDER — ONDANSETRON 8 MG/1
8 TABLET, ORALLY DISINTEGRATING ORAL EVERY 8 HOURS PRN
Status: DISCONTINUED | OUTPATIENT
Start: 2025-04-17 | End: 2025-04-17 | Stop reason: HOSPADM

## 2025-04-17 RX ORDER — PROPOFOL 10 MG/ML
INJECTION, EMULSION INTRAVENOUS
Status: DISCONTINUED | OUTPATIENT
Start: 2025-04-17 | End: 2025-04-17

## 2025-04-17 RX ORDER — IBUPROFEN 600 MG/1
600 TABLET ORAL EVERY 6 HOURS PRN
Qty: 20 TABLET | Refills: 0 | Status: SHIPPED | OUTPATIENT
Start: 2025-04-17

## 2025-04-17 RX ORDER — LIDOCAINE HYDROCHLORIDE 20 MG/ML
INJECTION INTRAVENOUS
Status: DISCONTINUED | OUTPATIENT
Start: 2025-04-17 | End: 2025-04-17

## 2025-04-17 RX ORDER — SODIUM CHLORIDE, SODIUM LACTATE, POTASSIUM CHLORIDE, CALCIUM CHLORIDE 600; 310; 30; 20 MG/100ML; MG/100ML; MG/100ML; MG/100ML
INJECTION, SOLUTION INTRAVENOUS CONTINUOUS
Status: DISCONTINUED | OUTPATIENT
Start: 2025-04-17 | End: 2025-04-17 | Stop reason: HOSPADM

## 2025-04-17 RX ORDER — HYDROCODONE BITARTRATE AND ACETAMINOPHEN 10; 325 MG/1; MG/1
1 TABLET ORAL EVERY 4 HOURS PRN
Status: DISCONTINUED | OUTPATIENT
Start: 2025-04-17 | End: 2025-04-17 | Stop reason: HOSPADM

## 2025-04-17 RX ORDER — ONDANSETRON HYDROCHLORIDE 2 MG/ML
INJECTION, SOLUTION INTRAVENOUS
Status: DISCONTINUED | OUTPATIENT
Start: 2025-04-17 | End: 2025-04-17

## 2025-04-17 RX ORDER — IBUPROFEN 600 MG/1
600 TABLET ORAL EVERY 6 HOURS PRN
Status: DISCONTINUED | OUTPATIENT
Start: 2025-04-17 | End: 2025-04-17 | Stop reason: HOSPADM

## 2025-04-17 RX ORDER — FENTANYL CITRATE 50 UG/ML
INJECTION, SOLUTION INTRAMUSCULAR; INTRAVENOUS
Status: DISCONTINUED | OUTPATIENT
Start: 2025-04-17 | End: 2025-04-17

## 2025-04-17 RX ORDER — ACETAMINOPHEN 10 MG/ML
INJECTION, SOLUTION INTRAVENOUS
Status: DISCONTINUED | OUTPATIENT
Start: 2025-04-17 | End: 2025-04-17

## 2025-04-17 RX ORDER — DIPHENHYDRAMINE HCL 25 MG
25 CAPSULE ORAL EVERY 4 HOURS PRN
Status: DISCONTINUED | OUTPATIENT
Start: 2025-04-17 | End: 2025-04-17 | Stop reason: HOSPADM

## 2025-04-17 RX ORDER — MIDAZOLAM HYDROCHLORIDE 1 MG/ML
INJECTION INTRAMUSCULAR; INTRAVENOUS
Status: DISCONTINUED | OUTPATIENT
Start: 2025-04-17 | End: 2025-04-17

## 2025-04-17 RX ORDER — AMOXICILLIN 250 MG
1 CAPSULE ORAL 2 TIMES DAILY
Status: DISCONTINUED | OUTPATIENT
Start: 2025-04-17 | End: 2025-04-17 | Stop reason: HOSPADM

## 2025-04-17 RX ORDER — HYDROCODONE BITARTRATE AND ACETAMINOPHEN 5; 325 MG/1; MG/1
1 TABLET ORAL EVERY 4 HOURS PRN
Status: DISCONTINUED | OUTPATIENT
Start: 2025-04-17 | End: 2025-04-17 | Stop reason: HOSPADM

## 2025-04-17 RX ADMIN — PROPOFOL 150 MG: 10 INJECTION, EMULSION INTRAVENOUS at 09:04

## 2025-04-17 RX ADMIN — FENTANYL CITRATE 50 MCG: 50 INJECTION, SOLUTION INTRAMUSCULAR; INTRAVENOUS at 09:04

## 2025-04-17 RX ADMIN — PROPOFOL 50 MG: 10 INJECTION, EMULSION INTRAVENOUS at 09:04

## 2025-04-17 RX ADMIN — LIDOCAINE HYDROCHLORIDE 60 MG: 20 INJECTION, SOLUTION INTRAVENOUS at 09:04

## 2025-04-17 RX ADMIN — ACETAMINOPHEN 1000 MG: 10 INJECTION, SOLUTION INTRAVENOUS at 09:04

## 2025-04-17 RX ADMIN — KETOROLAC TROMETHAMINE 30 MG: 30 INJECTION, SOLUTION INTRAMUSCULAR at 09:04

## 2025-04-17 RX ADMIN — MIDAZOLAM HYDROCHLORIDE 2 MG: 1 INJECTION, SOLUTION INTRAMUSCULAR; INTRAVENOUS at 09:04

## 2025-04-17 RX ADMIN — SODIUM CHLORIDE, SODIUM LACTATE, POTASSIUM CHLORIDE, AND CALCIUM CHLORIDE: .6; .31; .03; .02 INJECTION, SOLUTION INTRAVENOUS at 08:04

## 2025-04-17 RX ADMIN — ONDANSETRON 4 MG: 2 INJECTION, SOLUTION INTRAMUSCULAR; INTRAVENOUS at 09:04

## 2025-04-17 RX ADMIN — DEXAMETHASONE SODIUM PHOSPHATE 4 MG: 4 INJECTION, SOLUTION INTRAMUSCULAR; INTRAVENOUS at 09:04

## 2025-04-17 NOTE — DISCHARGE SUMMARY
[facility]  Obstetrics & Gynecology  Discharge Summary    Patient Name: Manuel Davies  MRN: [id number]  Admission Date: 4/17/2025  Hospital Length of Stay: 0 days  Discharge Date and Time: 04/17/2025 9:52 AM  Attending Physician: Linn Chaidez MD   Discharging Provider: Linn Chaidez MD  Primary Care Provider: No, Primary Doctor    HPI:  Pt with endometrial polyp and h/o recurrent miscarriages.    Hospital Course:  Patient presented for scheduled surgery, see op note.  She was transferred to PACU to recover.  No acute events.  She was discharged home when meeting all discharge criteria.      Goals of Care Treatment Preferences:         Procedure(s) (LRB):  POLYPECTOMY, UTERUS, HYSTEROSCOPIC (N/A)  HYSTEROSCOPY, WITH DILATION AND CURETTAGE OF UTERUS (N/A)         Significant Diagnostic Studies: N/A      Pending Diagnostic Studies:       Procedure Component Value Units Date/Time    Specimen to Pathology Gynecology and Obstetrics [[id number]] Collected: 04/17/25 0935    Order Status: Sent Lab Status: No result     Specimen: Tissue from Polyp           Final Active Diagnoses:    Diagnosis Date Noted POA    PRINCIPAL PROBLEM:  Endometrium, polyp [N84.0] 04/17/2025 Yes    History of recurrent miscarriages [N96] 04/17/2025 Yes      Problems Resolved During this Admission:        Discharged Condition: good    Disposition: home    Follow Up:   Follow-up Information       Linn Chaidez MD Follow up in 2 week(s).    Specialty: Obstetrics and Gynecology  Contact information:  [place] [id number] [phone]                           Patient Instructions:      Diet general     Wound care routine (specify)   Order Comments: Wound care routine:   1. Keep clean and dry  2. Showers only  3. No creams/ointment  4. Remove soiled sterisrips     Call MD for:  temperature >100.4     Call MD for:  persistent nausea and vomiting     Call MD for:  severe uncontrolled pain      Call MD for:  difficulty breathing, headache or visual disturbances     Call MD for:  redness, tenderness, or signs of infection (pain, swelling, redness, odor or green/yellow discharge around incision site)     Call MD for:  hives     Call MD for:  persistent dizziness or light-headedness     Call MD for:  extreme fatigue     Call MD for:     Medications:  Reconciled Home Medications:      Medication List        START taking these medications      ibuprofen 600 MG tablet  Commonly known as: ADVIL,MOTRIN  Take 1 tablet (600 mg total) by mouth every 6 (six) hours as needed for Pain.            CONTINUE taking these medications      PRENATAL 13-IRON-FA-DSS-DHA ORAL  Take by mouth.              Linn Chaidez MD  Obstetrics & Gynecology  [place] [facility]

## 2025-04-17 NOTE — ANESTHESIA PROCEDURE NOTES
Intubation    Date/Time: 4/17/2025 9:17 AM    Performed by: Ray Christopher CRNA  Authorized by: Ray Christopher CRNA    Intubation:     Induction:  Intravenous    Intubated:  Postinduction    Mask Ventilation:  N/a    Attempts:  1    Attempted By:  CRNA    Difficult Airway Encountered?: No      Complications:  None    Airway Device:  Supraglottic airway/LMA    Airway Device Size:  2.5    Style/Cuff Inflation:  Cuffed (inflated to minimal occlusive pressure)    Secured at:  The lips    Placement Verified By:  Capnometry    Complicating Factors:  None    Findings Post-Intubation:  BS equal bilateral and atraumatic/condition of teeth unchanged

## 2025-04-17 NOTE — OP NOTE
Operative Report    Procedure:Hysteroscopic Polypectomy    Date of Surgery 04/17/2025    Pre-Op Diagnosis: AUB, endometrial polyp; recurrent miscarriage    Post-op Diagnosis:  Same    Attending Surgeon: Linn Chaidez MD    Anesthesia: General/LMA    EBL: 10mL     Urine Output: 30 mL     Specimen: endometrial polyp    Fluid deficit: 280 mL    Findings: Cervical os dilated to 7 Montserratian by the hegar Dilators.  Hysteroscopic findings include: Endometrial polyp over the left tubal ostia Specimens obtained and sent to pathology.  Hemostasis obtained at the end of the procedure, fluid deficit 280cc    Procedure in Detail:  The patient was taken to the Operating Room where general was obtained, the patient was placed in the lithotomy position, with her legs in the Matthew stirrups.  The patient was then prepped and draped in the normal sterile fashion. The weighted speculum was placed in the posterior aspect of the vagina and the right angle retractor was placed in the anterior aspect of the vagina.  The cervix was visualized and an Allis clamp was placed on the anterior aspect of the cervix.  The cervix was dilated. The hysteroscope was then white balanced and the line cleared of air, it was then inserted into the cervical os and the uterine cavity was directly visualized, with above findings noted. The Myosure Lite device was inserted. Endometrial polyp was removed using Myosure device.  Specimens sent to pathology. The hysteroscope was then removed. The Allis was then removed and the cervix was hemostatic with pressure.  Sponge, lap and needle counts were correct x 2.  The patient tolerated the procedure well and was taken to recovery in stable condition.

## 2025-04-17 NOTE — TRANSFER OF CARE
Anesthesia Transfer of Care Note    Patient: Manuel Davies    Procedure(s) Performed: Procedure(s) (LRB):  POLYPECTOMY, UTERUS, HYSTEROSCOPIC (N/A)  HYSTEROSCOPY, WITH DILATION AND CURETTAGE OF UTERUS (N/A)    Patient location: PACU    Anesthesia Type: general    Transport from OR: Transported from OR on 6-10 L/min O2 by face mask with adequate spontaneous ventilation    Post pain: adequate analgesia    Post assessment: no apparent anesthetic complications and tolerated procedure well    Post vital signs: stable    Level of consciousness: sedated    Nausea/Vomiting: no nausea/vomiting    Complications: none    Transfer of care protocol was followed      Last vitals: Visit Vitals  /68 (BP Location: Left arm, Patient Position: Lying)   Pulse 68   Temp 36.3 °C (97.4 °F) (Oral)   Resp 16   LMP 04/17/2025 (Exact Date)   SpO2 100%   Breastfeeding No

## 2025-04-17 NOTE — ANESTHESIA PREPROCEDURE EVALUATION
"                                                                                                             04/17/2025  Manuel Davies is a 24 y.o., female.  History reviewed. No pertinent past medical history.  History reviewed. No pertinent surgical history.  No results for input(s): "WBC", "RBC", "HGB", "HCT", "PLT", "MCV", "MCH", "MCHC" in the last 24 hours.      Pre-op Assessment    I have reviewed the Patient Summary Reports.     I have reviewed the Nursing Notes. I have reviewed the NPO Status.   I have reviewed the Medications.     Review of Systems  Anesthesia Hx:  No problems with previous Anesthesia   History of prior surgery of interest to airway management or planning:            Denies Personal Hx of Anesthesia complications.                    Social:  Non-Smoker, No Alcohol Use       Hematology/Oncology:  Hematology Normal   Oncology Normal                                   EENT/Dental:  EENT/Dental Normal           Cardiovascular:  Cardiovascular Normal Exercise tolerance: good                                             Pulmonary:  Pulmonary Normal                       Renal/:  Renal/ Normal                 Hepatic/GI:  Hepatic/GI Normal                    Musculoskeletal:  Musculoskeletal Normal                Neurological:  Neurology Normal                                      Endocrine:  Endocrine Normal            Dermatological:  Skin Normal    Psych:  Psychiatric Normal                    Physical Exam  General: Well nourished, Cooperative, Alert and Oriented    Airway:  Mallampati: II   Mouth Opening: Normal  TM Distance: Normal  Tongue: Normal  Neck ROM: Normal ROM    Dental:  Intact        Anesthesia Plan  Type of Anesthesia, risks & benefits discussed:    Anesthesia Type: Gen Supraglottic Airway  Intra-op Monitoring Plan: Standard ASA Monitors  Post Op Pain Control Plan: multimodal analgesia and IV/PO Opioids PRN  Induction:  IV  Airway Plan: , " Post-Induction  Informed Consent: Informed consent signed with the Patient and all parties understand the risks and agree with anesthesia plan.  All questions answered. Consent completed with  assistancePatient consented to blood products? Yes  ASA Score: 2  Day of Surgery Review of History & Physical: H&P Update referred to the surgeon/provider.I have interviewed and examined the patient. I have reviewed the patient's H&P dated: 4/17/25. There are no significant changes.     Ready For Surgery From Anesthesia Perspective.     .

## 2025-04-17 NOTE — ANESTHESIA POSTPROCEDURE EVALUATION
Anesthesia Post Evaluation    Patient: Manuel Davies    Procedure(s) Performed: Procedure(s) (LRB):  POLYPECTOMY, UTERUS, HYSTEROSCOPIC (N/A)  HYSTEROSCOPY, WITH DILATION AND CURETTAGE OF UTERUS (N/A)    Final Anesthesia Type: general      Patient location during evaluation: PACU  Patient participation: Yes- Able to Participate  Level of consciousness: awake and alert and oriented  Post-procedure vital signs: reviewed and stable  Pain management: adequate  Airway patency: patent    PONV status at discharge: No PONV  Anesthetic complications: no      Cardiovascular status: blood pressure returned to baseline and hemodynamically stable  Respiratory status: unassisted, spontaneous ventilation and room air  Hydration status: euvolemic  Follow-up not needed.              Vitals Value Taken Time   /63 04/17/25 10:32   Temp 36.2 °C (97.1 °F) 04/17/25 09:52   Pulse 68 04/17/25 10:32   Resp 16 04/17/25 10:32   SpO2 100 % 04/17/25 10:22         Event Time   Out of Recovery 11:18:24         Pain/Rosina Score: Rosina Score: 10 (4/17/2025 10:32 AM)

## 2025-04-24 LAB
DHEA SERPL-MCNC: NORMAL
ESTROGEN SERPL-MCNC: NORMAL PG/ML
INSULIN SERPL-ACNC: NORMAL U[IU]/ML
LAB AP CLINICAL INFORMATION: NORMAL
LAB AP GROSS DESCRIPTION: NORMAL
LAB AP PERFORMING LOCATION(S): NORMAL
LAB AP REPORT FOOTNOTES: NORMAL

## 2025-04-25 ENCOUNTER — TELEPHONE (OUTPATIENT)
Dept: OBSTETRICS AND GYNECOLOGY | Facility: CLINIC | Age: 25
End: 2025-04-25

## 2025-04-25 NOTE — TELEPHONE ENCOUNTER
Called pt using language line  Kwabena ID# [id number]. Per pt she stated husbands rx for chlamydia was never called in. Per pt she called clinic after we informed her of her results with her  MRN [id number]. Verified with pt that rx was called in and confirmed pharmacy. Per pt she did not go pick it up because she did not get an alert from the pharmacy that a rx was ready. Informed pt that  will need to contact pharmacy to initiate the fill. Per pt she did complete her treatment. Asked pt is she had intercourse with  since then and she said she did, but it was protected. Informed pt again that  will need to get with pharmacy for his medication. Pt voiced understanding.

## 2025-04-25 NOTE — TELEPHONE ENCOUNTER
----- Message from Med Assistant Gamble sent at 4/25/2025 12:50 PM CDT -----  Contact: self  Manuel DaviesMRN: [id number] Phone      Not on file.Work Phone      Not on file.Mobile          [phone] Care Team:No, Primary Doctor as PCP - Linn Fishman MD as Consulting Physician (Obstetrics and Gynecology)OB? NoWhat phone number can you be reached at? [phone]:  Following up on medication that was to be called in for std.

## 2025-05-02 ENCOUNTER — OFFICE VISIT (OUTPATIENT)
Dept: OBSTETRICS AND GYNECOLOGY | Facility: CLINIC | Age: 25
End: 2025-05-02

## 2025-05-02 VITALS
HEIGHT: 68 IN | SYSTOLIC BLOOD PRESSURE: 116 MMHG | WEIGHT: 149.19 LBS | HEART RATE: 76 BPM | BODY MASS INDEX: 22.61 KG/M2 | DIASTOLIC BLOOD PRESSURE: 72 MMHG

## 2025-05-02 DIAGNOSIS — Z11.3 SCREENING EXAMINATION FOR VENEREAL DISEASE: Primary | ICD-10-CM

## 2025-05-02 DIAGNOSIS — Z09 POSTOPERATIVE EXAMINATION: ICD-10-CM

## 2025-05-02 PROCEDURE — 99999 PR PBB SHADOW E&M-EST. PATIENT-LVL III: CPT | Mod: PBBFAC,,, | Performed by: OBSTETRICS & GYNECOLOGY

## 2025-05-02 PROCEDURE — 99213 OFFICE O/P EST LOW 20 MIN: CPT | Mod: PBBFAC | Performed by: OBSTETRICS & GYNECOLOGY

## 2025-05-02 NOTE — PROGRESS NOTES
"Obstetrics and Gynecology  Post-operative Progress Note    Chief Complaint   Patient presents with    Post-op Evaluation       Manuel Davies is a 24 y.o. female  post-op from a hysteroscopic polypectomy on 25.  Patient is Doing well postoperatively.      The pathology revealed:   Endometrium, polyp, curettage:   Endometrium with decidualized changes secondary to retained products of conception  Negative for hyperplasia and malignancy     History reviewed. No pertinent past medical history.  Past Surgical History:   Procedure Laterality Date    HYSTEROSCOPIC POLYPECTOMY OF UTERUS N/A 2025    Procedure: POLYPECTOMY, UTERUS, HYSTEROSCOPIC;  Surgeon: Linn Chaidez MD;  Location: [facility];  Service: OB/GYN;  Laterality: N/A;     Family History   Problem Relation Name Age of Onset    Breast cancer Neg Hx      Colon cancer Neg Hx      Ovarian cancer Neg Hx       Social History[1]  OB History    Para Term  AB Living   3 0 0 0 3 0   SAB IAB Ectopic Multiple Live Births   3 0 0 0 0      # Outcome Date GA Lbr Js/2nd Weight Sex Type Anes PTL Lv   3 SAB 03/10/25     SAB      2 SAB 10/07/24     SAB         Birth Comments: Cytotec   1 2024               /72   Pulse 76   Ht 5' 8" (1.727 m)   Wt 67.7 kg (149 lb 3.2 oz)   LMP 2025 (Exact Date)   BMI 22.69 kg/m²     ROS:  GENERAL: No fever, chills, fatigability or weight loss.  VULVAR: No pain, no lesions and no itching.  VAGINAL: No relaxation, no itching, no discharge, no abnormal bleeding and no lesions.  ABDOMEN: No abdominal pain. Denies nausea. Denies vomiting. No diarrhea. No constipation  BREAST: Denies pain. No lumps. No discharge.  URINARY: No incontinence, no nocturia, no frequency and no dysuria.  CARDIOVASCULAR: No chest pain. No shortness of breath. No leg cramps.  NEUROLOGICAL: No headaches. No vision changes.    PE:   General appearance: alert, appears stated age, cooperative, and no " distress  Abdomen: soft, NT  Pelvic: cervix normal in appearance, external genitalia normal, no adnexal masses or tenderness, no cervical motion tenderness, rectovaginal septum normal, uterus normal size, shape, and consistency, and vagina normal without discharge      ASSESSMENT:    1. Screening examination for venereal disease  C. trachomatis/N. gonorrhoeae by AMP DNA [facility]; Cervix           PLAN:  Pathology reviewed  Restrictions discussed, none.  GC/CT repeated  Follow up prn           [1]   Social History  Tobacco Use    Smoking status: Never    Smokeless tobacco: Never   Substance Use Topics    Alcohol use: Not Currently     Comment: Not currently    Drug use: Never

## 2025-05-07 ENCOUNTER — RESULTS FOLLOW-UP (OUTPATIENT)
Dept: OBSTETRICS AND GYNECOLOGY | Facility: CLINIC | Age: 25
End: 2025-05-07

## (undated) DEVICE — Device

## (undated) DEVICE — PACK FLUENT DISPOSABLE

## (undated) DEVICE — SCRUB HIBICLENS 4% CHG 4OZ

## (undated) DEVICE — GLOVE 6.0 PROTEXIS PI MICRO

## (undated) DEVICE — TRAY URETHRAL CATH 14FR KC3410

## (undated) DEVICE — TUBING SUC UNIV W/CONN 12FT

## (undated) DEVICE — DEVICE MYOSURE LITE TISS REM